# Patient Record
Sex: FEMALE | Race: BLACK OR AFRICAN AMERICAN | NOT HISPANIC OR LATINO | Employment: UNEMPLOYED
[De-identification: names, ages, dates, MRNs, and addresses within clinical notes are randomized per-mention and may not be internally consistent; named-entity substitution may affect disease eponyms.]

---

## 2017-07-08 ENCOUNTER — HEALTH MAINTENANCE LETTER (OUTPATIENT)
Age: 32
End: 2017-07-08

## 2017-07-24 ENCOUNTER — TELEPHONE (OUTPATIENT)
Dept: FAMILY MEDICINE | Facility: CLINIC | Age: 32
End: 2017-07-24

## 2017-07-24 ENCOUNTER — OFFICE VISIT (OUTPATIENT)
Dept: FAMILY MEDICINE | Facility: CLINIC | Age: 32
End: 2017-07-24
Payer: COMMERCIAL

## 2017-07-24 VITALS
SYSTOLIC BLOOD PRESSURE: 112 MMHG | HEART RATE: 55 BPM | BODY MASS INDEX: 21.68 KG/M2 | WEIGHT: 127 LBS | TEMPERATURE: 98.4 F | HEIGHT: 64 IN | DIASTOLIC BLOOD PRESSURE: 72 MMHG | OXYGEN SATURATION: 100 %

## 2017-07-24 DIAGNOSIS — S06.0X0A MILD CONCUSSION, WITHOUT LOC, INITIAL ENCOUNTER: Primary | ICD-10-CM

## 2017-07-24 PROCEDURE — 99213 OFFICE O/P EST LOW 20 MIN: CPT | Performed by: FAMILY MEDICINE

## 2017-07-24 NOTE — NURSING NOTE
"Chief Complaint   Patient presents with     Head Injury       Initial /72  Pulse 55  Temp 98.4  F (36.9  C) (Oral)  Ht 5' 4\" (1.626 m)  Wt 127 lb (57.6 kg)  LMP 07/21/2017  SpO2 100%  BMI 21.8 kg/m2 Estimated body mass index is 21.8 kg/(m^2) as calculated from the following:    Height as of this encounter: 5' 4\" (1.626 m).    Weight as of this encounter: 127 lb (57.6 kg).  Medication Reconciliation: complete   Jessica Ribeiro Certified Medical Assistant      "

## 2017-07-24 NOTE — TELEPHONE ENCOUNTER
Concern - Head injury  Onset: 3 days ago    Description:   Hit head when slipped in shower on Friday.     Intensity: Unable to determine--is having difficulty concentrating, has brusing and swelling on forehead where hit head.     Progression of Symptoms:  same    Accompanying Signs & Symptoms:  Unable to sleep, feels like is slow moving doing things    Previous history of similar problem:   NA    Precipitating factors:   Worsened by: NA      Alleviating factors:  Improved by: NA      Friday evening patient slipped and hit head hard getting out of shower. Bruising and swelling present on forehead where she hit, and near eye. Has a bad headache, not able to sleep. No loss of memory or loss of consciousness, feels like it is taking longer to do things. No progression of symptoms, no respiratory problems. No weakness of extremities. Discussed with Dr. Weiler -- appointment made for today. Rosey Davison R.N.

## 2017-07-24 NOTE — MR AVS SNAPSHOT
After Visit Summary   7/24/2017    Kianna Gore    MRN: [id number]           Patient Information     Date Of Birth          1985        Visit Information        Provider Department      7/24/2017 11:40 AM Weiler, Karen, MD [facility]        Today's Diagnoses     Mild concussion, without LOC, initial encounter    -  1      Care Instructions                   Concussion  What is a concussion?   A concussion is an injury to the brain caused by a blow to the head. A concussion may cause you to become temporarily confused or disoriented, have memory loss (amnesia), or become unconscious.   Concussions are the most common head injuries in sports.   How does it occur?   A concussion occurs when a blow to the head causes shaking, jarring, stretching, swelling, or tearing of brain tissue and delicate nerve fibers.   What are the symptoms?   If you have had a concussion you may have any of the following symptoms:   headache   confusion   memory loss (amnesia)   loss of consciousness   sleepiness   nausea or vomiting   trouble thinking   dizziness   weakness   seizures   loss of balance   blurred vision   sensitivity to light   You may have these symptoms for several days, weeks, or longer after the injury. This is called post-concussive syndrome.   If your neck hurts after a head injury, it is best to try not to move more than is necessary until it is checked by a healthcare provider. Anyone with a possibly serious neck injury should not move at all and an ambulance should be called.   How is it diagnosed?   Your healthcare provider will examine you and find out what happened. If you can't remember what happened, he or she may need to get this information from other people saw the accident. Your healthcare provider will:   do a neurologic exam   test your strength   test your memory   test your sensation, balance, and reflexes   check your vision   look at your eyes with a flashlight to  see if your pupils are the same size   You may be tested again several times during the next hour to check for any worsening of brain function, which might occur if you have any bleeding or swelling in the brain.   Your provider may do a CT scan or an MRI of your head to be sure there is no damage to your brain. Depending on how your head injury occurred, you may have neck X-rays to check your spine.   How is it treated?   The treatment for a concussion is rest. This means you may need to miss school, work, or other activities. Exercising too soon will make your symptoms last longer and may cause more problems. Limit activities that require thinking and concentration, such as, working on a computer or playing video games. Your brain needs to rest.   Headache may be treated with a mild pain reliever, such as acetaminophen. Nausea may be treated with a prescription medicine. Clear liquids or bland foods may be helpful.   If you have had a concussion, you need to be watched by a friend or relative for 8 to 12 hours. You should be awakened and checked every 2 to 4 hours while sleeping. Symptoms to report to your healthcare provider include:   confusion   seizures   unequal pupil sizes   restlessness or irritability   trouble using your legs or arms   worsening vomiting   headache that will not go away after taking acetaminophen (Tylenol)   garbled speech   bleeding from the ears or nose   decreasing alertness   unusual sleepiness or hard to wake up   a change in personality   If you are stable and recovering during the next 24 hours, you should rest for an another day or two. As your symptoms go away, you can begin to go back to your usual daily routine. However, you should stay away from any activities that would risk reinjury. A second concussion before the first one has healed could be very serious. Your healthcare provider will tell you when it is safe to return to sports and other activities.   How can I prevent a  concussion?   It is very important to understand that receiving a second blow to the head before the first injury is fully healed can be fatal, even if the second injury seems minor.   Using proper equipment (such as helmets and seat belts) and following proper techniques in sports such as football and soccer are the best ways to prevent concussions.     Published by [employer].  This content is reviewed periodically and is subject to change as new health information becomes available. The information is intended to inform and educate and is not a replacement for medical evaluation, advice, diagnosis or treatment by a healthcare professional.   Written by Yovany Yu MD, and Lola Hoyos MD, for [facility] [facility].   ? 2010 [facility] [place] and/or its affiliates. All Rights Reserved.   Copyright   Clinical Reference Systems 2011    Graduated return to play protocol after concussion  Rehabilitation stage  Functional exercise at each stage of rehabilitation  Objective of each stage    1. No activity Complete physical and cognitive rest Recovery   2. Light aerobic exercise Walking, swimming or stationary cycling keeping intensity <70 percent HR; no resistance training Increase HR   3. Sport-specific exercise Skating drills in ice hockey, running drills in soccer; no head impact activities Add movement   4. Non-contact training drills Progression to more complex training drills, eg, passing drills in football and ice hockey; may start progressive resistance training Exercise, coordination, and cognitive load   5. Full contact practice Following medical clearance, participate in normal training activities Restore confidence and assess functional skills by coaching staff   6. Return to play Normal game play    Six-day return to play protocol. Each day the athlete makes a stepwise increase in functional activity, is evaluated for symptoms, and is allowed to progress to the next stage each successive day if asymptomatic.   Clin  "J Sport Med 2009; 19:185. Copyright   2009 [employer].            Follow-ups after your visit        Who to contact     If you have questions or need follow up information about today's clinic visit or your schedule please contact [facility] directly at [phone].  Normal or non-critical lab and imaging results will be communicated to you by MyChart, letter or phone within 4 business days after the clinic has received the results. If you do not hear from us within 7 days, please contact the clinic through [employer] or phone. If you have a critical or abnormal lab result, we will notify you by phone as soon as possible.  Submit refill requests through [employer] or call your pharmacy and they will forward the refill request to us. Please allow 3 business days for your refill to be completed.          Additional Information About Your Visit        MyChart Information     [employer] gives you secure access to your electronic health record. If you see a primary care provider, you can also send messages to your care team and make appointments. If you have questions, please call your primary care clinic.  If you do not have a primary care provider, please call [phone] and they will assist you.        Care EveryWhere ID     This is your Care EveryWhere ID. This could be used by other organizations to access your [facility] medical records  [id number]        Your Vitals Were     Pulse Temperature Height Last Period Pulse Oximetry BMI (Body Mass Index)    55 98.4  F (36.9  C) (Oral) 5' 4\" (1.626 m) 07/21/2017 100% 21.8 kg/m2       Blood Pressure from Last 3 Encounters:   07/24/17 112/72   11/07/16 106/72   08/18/15 98/62    Weight from Last 3 Encounters:   07/24/17 127 lb (57.6 kg)   11/07/16 126 lb (57.2 kg)   08/18/15 120 lb (54.4 kg)              Today, you had the following     No orders found for display       Primary Care Provider Office Phone # Fax #    Karen Weiler, [phone] " [phone]       [facility] [address]  [facility] [address]        Equal Access to Services     YONNY GILBERT : Hadii jenny miller hadkvngo Sovalali, waaxda luqadaha, qaybta kaalmada geraldo, brett nadiyain hayaamick maciasscarlett mendoza laJodeetasha kelly. So [facility] [phone].    ATENCIÓN: Si habla español, tiene a shoemaker disposición servicios gratuitos de asistencia lingüística. Llame al [phone].    We comply with applicable federal civil rights laws and [place] laws. We do not discriminate on the basis of race, color, national origin, age, disability sex, sexual orientation or gender identity.            Thank you!     Thank you for choosing [facility]  for your care. Our goal is always to provide you with excellent care. Hearing back from our patients is one way we can continue to improve our services. Please take a few minutes to complete the written survey that you may receive in the mail after your visit with us. Thank you!             Your Updated Medication List - Protect others around you: Learn how to safely use, store and throw away your medicines at www.disposemymeds.org.      Notice  As of 7/24/2017 12:35 PM    You have not been prescribed any medications.

## 2017-07-24 NOTE — PROGRESS NOTES
SUBJECTIVE:                                                    Kianna Gore is a 31 year old female who presents to clinic today for the following health issues:    Concern - Head injury  Onset: 3 days ago    Description:   Hit head when slipped in shower on Friday.     Intensity: Unable to determine--is having difficulty concentrating, has brusing and swelling on forehead where hit head.     Progression of Symptoms:  same    Accompanying Signs & Symptoms:  Unable to sleep, feels like is slow moving doing things    Previous history of similar problem:   NA    Precipitating factors:   Worsened by: NA       Alleviating factors:  Improved by: NA       Triaged today (7/24/17):    Friday evening (7/21/17) pt slipped and hit head getting out of shower, bruising and swelling present on forehead where she hit, and near eye. Has a bad headache, not able to sleep. No loss of memory or LOC, feels like it is taking longer to do things. No progression of symptoms, no respiratory problems. No weakness of extremities. Discussed with Dr. Weiler -- appointment made for today. Rosey Davison R.N.    Today's visit (7/24/17):    No changes since talking to nurse today.    Pt notes she has hx of migraines, they have returned, was dizzy in the shower, fell as a result. No one was at home with her when she fell. Pt is experiencing mild blurry vision, no emesis or nausea, migraine has been constant since last Friday. Bright lights and electronic screens irritate her. She hit her head on her bathroom door frame. She used ice over weekend. She has been doing acupuncture for her migraines, stopped due to family problems, migraines redeveloped last month, has never been on everyday medications. Pt has been having hard time concentrating at her job. Her forehead pain is exacerbated with smiling, facial movements. No chest pain or palpitations. She attributes her falling to the residual effects of her chronic migraines and the heat of  "the water in from the shower. No back, or neck pain. Last night pt had hard time sleeping, was restless, felt exhausted but could not fall asleep. She notes she is not good with pills, does not want to be prescribed pain meds.      Problem list and histories reviewed & adjusted, as indicated.  Additional history: as documented      Reviewed and updated as needed this visit by clinical staff  Tobacco  Allergies  Meds  Med Hx  Surg Hx  Fam Hx  Soc Hx      Reviewed and updated as needed this visit by Provider       ROS:  Constitutional, HEENT, cardiovascular, pulmonary, gi and gu systems are negative, except as otherwise noted.    This document serves as a record of the services and decisions personally performed and made by Karen Weiler, MD. It was created on her behalf by Barron Nevarez, a trained medical scribe. The creation of this document is based on the provider's statements to the medical scribe.  Barron Nevarez 12:37 PM July 24, 2017    OBJECTIVE:   /72  Pulse 55  Temp 98.4  F (36.9  C) (Oral)  Ht 1.626 m (5' 4\")  Wt 57.6 kg (127 lb)  LMP 07/21/2017  SpO2 100%  BMI 21.8 kg/m2  Body mass index is 21.8 kg/(m^2).  GENERAL: healthy, alert and no distress  EYES: Eyes grossly normal to inspection, PERRL and conjunctivae and sclerae normal  HENT: ear canals and TM's normal, nose and mouth without ulcers or lesions  NECK: no adenopathy, no asymmetry, masses, or scars and thyroid normal to palpation  RESP: lungs clear to auscultation - no rales, rhonchi or wheezes  CV: regular rate and rhythm, normal S1 S2, no S3 or S4, no murmur, click or rub, no peripheral edema and peripheral pulses strong  ABDOMEN: soft, nontender, no hepatosplenomegaly, no masses and bowel sounds normal  MS: no gross musculoskeletal defects noted, no edema  NEURO: CN II-XII grossly intact.  Strength 5/5 and symmetrical in extremities.  FTN-FTF intact, normal strength and tone, mentation intact and speech normal  PSYCH: mentation " appears normal, affect normal/bright    Diagnostic Test Results:  none    ASSESSMENT/PLAN:     (S06.0X0A) Mild concussion, without LOC, initial encounter  (primary encounter diagnosis)  Comment: Discussed treatment options, will write note to have pt take work off until 7/28/17 to rest, keep applying ice to swollen area, no electronic screens, no heavy reading, lots of sleep. Pt is not good with pills, does not want to be prescribed pain meds, advised her to avoid taking Aspirin, take Ibuprofen as needed for pain.  Plan: Follow up if symptoms worsen or do not improve, follow up immediately if symptoms of emesis, increased fatigue, dizziness, memory loss occur. May consider imaging study if worsening symptoms.        The information in this document, created by the medical scribe for me, accurately reflects the services I personally performed and the decisions made by me. I have reviewed and approved this document for accuracy prior to leaving the patient care area.  July 24, 2017 12:37 PM    Karen Weiler, MD  [facility]

## 2017-07-24 NOTE — LETTER
Kianna Gore  [address]    [place] [address]        July 24, 2017           To Whom It May Concern:    Kianna Gore  was seen on 7/24/2017.  Please excuse her from work until 7/28/2017 due to injury. Please contact me with questions or concerns.        Sincerely,        Karen Weiler, MD

## 2017-07-24 NOTE — PATIENT INSTRUCTIONS
Concussion  What is a concussion?   A concussion is an injury to the brain caused by a blow to the head. A concussion may cause you to become temporarily confused or disoriented, have memory loss (amnesia), or become unconscious.   Concussions are the most common head injuries in sports.   How does it occur?   A concussion occurs when a blow to the head causes shaking, jarring, stretching, swelling, or tearing of brain tissue and delicate nerve fibers.   What are the symptoms?   If you have had a concussion you may have any of the following symptoms:   headache   confusion   memory loss (amnesia)   loss of consciousness   sleepiness   nausea or vomiting   trouble thinking   dizziness   weakness   seizures   loss of balance   blurred vision   sensitivity to light   You may have these symptoms for several days, weeks, or longer after the injury. This is called post-concussive syndrome.   If your neck hurts after a head injury, it is best to try not to move more than is necessary until it is checked by a healthcare provider. Anyone with a possibly serious neck injury should not move at all and an ambulance should be called.   How is it diagnosed?   Your healthcare provider will examine you and find out what happened. If you can't remember what happened, he or she may need to get this information from other people saw the accident. Your healthcare provider will:   do a neurologic exam   test your strength   test your memory   test your sensation, balance, and reflexes   check your vision   look at your eyes with a flashlight to see if your pupils are the same size   You may be tested again several times during the next hour to check for any worsening of brain function, which might occur if you have any bleeding or swelling in the brain.   Your provider may do a CT scan or an MRI of your head to be sure there is no damage to your brain. Depending on how your head injury occurred, you may have neck X-rays  to check your spine.   How is it treated?   The treatment for a concussion is rest. This means you may need to miss school, work, or other activities. Exercising too soon will make your symptoms last longer and may cause more problems. Limit activities that require thinking and concentration, such as, working on a computer or playing video games. Your brain needs to rest.   Headache may be treated with a mild pain reliever, such as acetaminophen. Nausea may be treated with a prescription medicine. Clear liquids or bland foods may be helpful.   If you have had a concussion, you need to be watched by a friend or relative for 8 to 12 hours. You should be awakened and checked every 2 to 4 hours while sleeping. Symptoms to report to your healthcare provider include:   confusion   seizures   unequal pupil sizes   restlessness or irritability   trouble using your legs or arms   worsening vomiting   headache that will not go away after taking acetaminophen (Tylenol)   garbled speech   bleeding from the ears or nose   decreasing alertness   unusual sleepiness or hard to wake up   a change in personality   If you are stable and recovering during the next 24 hours, you should rest for an another day or two. As your symptoms go away, you can begin to go back to your usual daily routine. However, you should stay away from any activities that would risk reinjury. A second concussion before the first one has healed could be very serious. Your healthcare provider will tell you when it is safe to return to sports and other activities.   How can I prevent a concussion?   It is very important to understand that receiving a second blow to the head before the first injury is fully healed can be fatal, even if the second injury seems minor.   Using proper equipment (such as helmets and seat belts) and following proper techniques in sports such as football and soccer are the best ways to prevent concussions.     Published by  [facility].  This content is reviewed periodically and is subject to change as new health information becomes available. The information is intended to inform and educate and is not a replacement for medical evaluation, advice, diagnosis or treatment by a healthcare professional.   Written by Yovany Yu MD, and Lola Hoyos MD, for [email or web address].   ? 2010 [facility] and/or its affiliates. All Rights Reserved.   Copyright   Clinical Reference Systems 2011    Graduated return to play protocol after concussion  Rehabilitation stage  Functional exercise at each stage of rehabilitation  Objective of each stage    1. No activity Complete physical and cognitive rest Recovery   2. Light aerobic exercise Walking, swimming or stationary cycling keeping intensity <70 percent HR; no resistance training Increase HR   3. Sport-specific exercise Skating drills in ice hockey, running drills in soccer; no head impact activities Add movement   4. Non-contact training drills Progression to more complex training drills, eg, passing drills in football and ice hockey; may start progressive resistance training Exercise, coordination, and cognitive load   5. Full contact practice Following medical clearance, participate in normal training activities Restore confidence and assess functional skills by coaching staff   6. Return to play Normal game play    Six-day return to play protocol. Each day the athlete makes a stepwise increase in functional activity, is evaluated for symptoms, and is allowed to progress to the next stage each successive day if asymptomatic.   Clin J Sport Med 2009; 19:185. Copyright   2009 [employer].

## 2018-10-09 ENCOUNTER — TRANSFERRED RECORDS (OUTPATIENT)
Dept: HEALTH INFORMATION MANAGEMENT | Facility: CLINIC | Age: 33
End: 2018-10-09

## 2019-03-29 ENCOUNTER — TELEPHONE (OUTPATIENT)
Dept: FAMILY MEDICINE | Facility: CLINIC | Age: 34
End: 2019-03-29

## 2019-03-29 ENCOUNTER — OFFICE VISIT (OUTPATIENT)
Dept: URGENT CARE | Facility: URGENT CARE | Age: 34
End: 2019-03-29
Payer: COMMERCIAL

## 2019-03-29 VITALS
WEIGHT: 126.1 LBS | RESPIRATION RATE: 16 BRPM | HEART RATE: 65 BPM | TEMPERATURE: 98.5 F | SYSTOLIC BLOOD PRESSURE: 124 MMHG | OXYGEN SATURATION: 98 % | BODY MASS INDEX: 21.65 KG/M2 | DIASTOLIC BLOOD PRESSURE: 74 MMHG

## 2019-03-29 DIAGNOSIS — N61.0 MASTITIS, RIGHT, ACUTE: Primary | ICD-10-CM

## 2019-03-29 DIAGNOSIS — N63.0 LUMP OR MASS IN BREAST: ICD-10-CM

## 2019-03-29 PROCEDURE — 99213 OFFICE O/P EST LOW 20 MIN: CPT | Performed by: FAMILY MEDICINE

## 2019-03-29 RX ORDER — CEPHALEXIN 250 MG/5ML
500 POWDER, FOR SUSPENSION ORAL 4 TIMES DAILY
Qty: 400 ML | Refills: 0 | Status: SHIPPED | OUTPATIENT
Start: 2019-03-29 | End: 2019-04-22

## 2019-03-29 NOTE — TELEPHONE ENCOUNTER
Call placed to Kianna. See below note also.  This morning left breat still hurts has large swollen tender red lump near nipple. 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 . Denies injury to breast. No fever. Feels warm to touch.     I advised it is important she be evaluated today as could be a serious skin infection or other etiology--needs to be examined to evaluate.     She had appointment scheduled but I have cancelled this for her and she is going to go to Urgent Care instead as appointment was really far from home and Urgent Care will open at 1 pm today. Rosey Davison R.N.

## 2019-03-29 NOTE — PATIENT INSTRUCTIONS
Patient Education     Cellulitis  Cellulitis is an infection of the deep layers of skin. A break in the skin, such as a cut or scratch, can let bacteria under the skin. If the bacteria get to deep layers of the skin, it can be serious. If not treated, cellulitis can get into the bloodstream and lymph nodes. The infection can then spread throughout the body. This causes serious illness.  Cellulitis causes the affected skin to become red, swollen, warm, and sore. The reddened areas have a visible border. An open sore may leak fluid (pus). You may have a fever, chills, and pain.  Cellulitis is treated with antibiotics taken for 7 to 10 days. An open sore may be cleaned and covered with cool wet gauze. Symptoms should get better 1 to 2 days after treatment is started. Make sure to take all the antibiotics for the full number of days until they are gone. Keep taking the medicine even if your symptoms go away.  Home care  Follow these tips:    Limit the use of the part of your body with cellulitis.     If the infection is on your leg, keep your leg raised while sitting. This will help to reduce swelling.    Take all of the antibiotic medicine exactly as directed until it is gone. Do not miss any doses, especially during the first 7 days. Don t stop taking the medicine when your symptoms get better.    Keep the affected area clean and dry.    Wash your hands with soap and warm water before and after touching your skin. Anyone else who touches your skin should also wash his or her hands. Don't share towels.  Follow-up care  Follow up with your healthcare provider, or as advised. If your infection does not go away on the first antibiotic, your healthcare provider will prescribe a different one.  When to seek medical advice  Call your healthcare provider right away if any of these occur:    Red areas that spread    Swelling or pain that gets worse    Fluid leaking from the skin (pus)    Fever higher of 100.4  F (38.0  C) or  higher after 2 days on antibiotics  Date Last Reviewed: 9/1/2016 2000-2018 [employer]. [address] [place] [address], [place] [address]. All rights reserved. This information is not intended as a substitute for professional medical care. Always follow your healthcare professional's instructions.           Patient Education     Cellulitis  Cellulitis is an infection of the deep layers of skin. A break in the skin, such as a cut or scratch, can let bacteria under the skin. If the bacteria get to deep layers of the skin, it can be serious. If not treated, cellulitis can get into the bloodstream and lymph nodes. The infection can then spread throughout the body. This causes serious illness.  Cellulitis causes the affected skin to become red, swollen, warm, and sore. The reddened areas have a visible border. An open sore may leak fluid (pus). You may have a fever, chills, and pain.  Cellulitis is treated with antibiotics taken for 7 to 10 days. An open sore may be cleaned and covered with cool wet gauze. Symptoms should get better 1 to 2 days after treatment is started. Make sure to take all the antibiotics for the full number of days until they are gone. Keep taking the medicine even if your symptoms go away.  Home care  Follow these tips:    Limit the use of the part of your body with cellulitis.     If the infection is on your leg, keep your leg raised while sitting. This will help to reduce swelling.    Take all of the antibiotic medicine exactly as directed until it is gone. Do not miss any doses, especially during the first 7 days. Don t stop taking the medicine when your symptoms get better.    Keep the affected area clean and dry.    Wash your hands with soap and warm water before and after touching your skin. Anyone else who touches your skin should also wash his or her hands. Don't share towels.  Follow-up care  Follow up with your healthcare provider, or as advised. If your infection does not go  away on the first antibiotic, your healthcare provider will prescribe a different one.  When to seek medical advice  Call your healthcare provider right away if any of these occur:    Red areas that spread    Swelling or pain that gets worse    Fluid leaking from the skin (pus)    Fever higher of 100.4  F (38.0  C) or higher after 2 days on antibiotics  Date Last Reviewed: 9/1/2016 2000-2018 [employer]. [address] [place] [address], [place] [address]. All rights reserved. This information is not intended as a substitute for professional medical care. Always follow your healthcare professional's instructions.

## 2019-03-29 NOTE — TELEPHONE ENCOUNTER
Reason for Call:  Other call back    Detailed comments: Last night pain in breast - right side. This AM she noticed a lump and still has pain. Not sure what to do   Phone Number Patient can be reached at: Cell number on file:    Telephone Information:   Mobile [phone]       Best Time:nalinus     Can we leave a detailed message on this number? YES    Call taken on 3/29/2019 at 7:52 AM by Rose Da Silva

## 2019-03-30 NOTE — PROGRESS NOTES
SUBJECTIVE:   Chief Complaint   Patient presents with     Urgent Care     Breast Pain     Right breast has lump on it, pain started last night, bump today      Kianna Gore is a 33 year old female who presents for evaluation of and area of redness, tenderness, swelling and warmth of the skin that developed on the  right, lateral  breast.  Patient has had pain, skin erythema (reddened skin) and tenderness for 1 days.    Precipitating event was unknown,  .    Therapies tried: none.  She is not breast feeding,  Not pregnant , not peripartum  She has not had recent mammogram    Past Medical History:   Diagnosis Date     NO ACTIVE PROBLEMS      Patient Active Problem List   Diagnosis     CARDIOVASCULAR SCREENING; LDL GOAL LESS THAN 160     Appendicitis, acute       ALLERGIES:  Dairy [milk products]      No current outpatient medications on file prior to visit.  No current facility-administered medications on file prior to visit.     Social History     Tobacco Use     Smoking status: Never Smoker     Smokeless tobacco: Never Used   Substance Use Topics     Alcohol use: Yes     Alcohol/week: 0.0 oz     Comment: rarely        Family History   Problem Relation Age of Onset     Neurologic Disorder Father         parkinsons     Neurologic Disorder Paternal Grandmother         parkinsons     Depression Sister      Anxiety Disorder Sister        ROS:  CONSTITUTIONAL: mild fever, chills,    INTEGUMENTARY/SKIN: NEGATIVE for worrisome rashes,   EYES: NEGATIVE for vision changes or irritation  ENT/MOUTH: NEGATIVE for ear, mouth and throat problems  GI: NEGATIVE for nausea, abdominal pain,   change in bowel habits    OBJECTIVE:  /74   Pulse 65   Temp 98.5  F (36.9  C) (Oral)   Resp 16   Wt 57.2 kg (126 lb 1.6 oz)   SpO2 98%   Breastfeeding? No   BMI 21.65 kg/m      SKIN: area involved is 5 cm by 3 cm on the right,  lateral breast.   The skin appear erythematous, moderately swollen, with tenderness and warmth to  the touch.       GENERAL:  Alert, mild distress  EYES: EOMI,   conjunctiva clear  HENT: External ears with no swelling or lesions   Nose and lips without  Swelling, ulcers, erythema or lesions  NECK: normal pain free ROM  RESP: no labored respirations, no tachypnea  EXTREMITIES:   Full ROM without expression of pain or limitation x 4 extremities  NEURO: Normal strength and tone, ambulation without difficulty,   normal speech and mentation  PSYCH: mentation and affect appears normal and patient appearance--appropriately groomed       ASSESSMENT:  Mastitis, right, acute     - cephALEXin (KEFLEX) 250 MG/5ML suspension; Take 10 mLs (500 mg) by mouth 4 times daily for 10 days      the patient to monitor for signs or symptoms of worsening/ spreading infection.  Go to Urgent care or Emergency Department if worsening fevers/chills and/or spreading infection.  Warm, moist packs or towels can be applied to the region to aid in resolution of the infection.  Use Tylenol or ibuprofen for pain or fever.         Lump or mass in breast     - MA Diagnostic Digital Bilateral     we discussed that mastitis is uncommon in women who are not breast feeding or peripartum.    Concern that she may have obstruction of her ducts-   Will have diagnostic mammogram to evaluate for risk of malignant tissue and to discuss any additional  evaluation

## 2019-04-03 ENCOUNTER — HOSPITAL ENCOUNTER (OUTPATIENT)
Dept: ULTRASOUND IMAGING | Facility: CLINIC | Age: 34
End: 2019-04-03
Attending: FAMILY MEDICINE
Payer: COMMERCIAL

## 2019-04-03 ENCOUNTER — HOSPITAL ENCOUNTER (OUTPATIENT)
Dept: MAMMOGRAPHY | Facility: CLINIC | Age: 34
Discharge: HOME | End: 2019-04-03
Attending: FAMILY MEDICINE | Admitting: FAMILY MEDICINE
Payer: COMMERCIAL

## 2019-04-03 PROCEDURE — 77066 DX MAMMO INCL CAD BI: CPT

## 2019-04-03 PROCEDURE — 76642 ULTRASOUND BREAST LIMITED: CPT | Mod: RT

## 2019-04-10 ENCOUNTER — HOSPITAL ENCOUNTER (OUTPATIENT)
Dept: ULTRASOUND IMAGING | Facility: CLINIC | Age: 34
End: 2019-04-10
Attending: FAMILY MEDICINE
Payer: COMMERCIAL

## 2019-04-10 ENCOUNTER — HOSPITAL ENCOUNTER (OUTPATIENT)
Dept: ULTRASOUND IMAGING | Facility: CLINIC | Age: 34
Discharge: HOME | End: 2019-04-10
Attending: FAMILY MEDICINE | Admitting: FAMILY MEDICINE
Payer: COMMERCIAL

## 2019-04-10 ENCOUNTER — HOSPITAL ENCOUNTER (OUTPATIENT)
Dept: MAMMOGRAPHY | Facility: CLINIC | Age: 34
End: 2019-04-10
Attending: FAMILY MEDICINE
Payer: COMMERCIAL

## 2019-04-10 DIAGNOSIS — N63.0 LUMP OR MASS IN BREAST: ICD-10-CM

## 2019-04-10 PROCEDURE — 88305 TISSUE EXAM BY PATHOLOGIST: CPT | Performed by: FAMILY MEDICINE

## 2019-04-10 PROCEDURE — 40000986 MA POST PROCEDURE RIGHT

## 2019-04-10 PROCEDURE — 38505 NEEDLE BIOPSY LYMPH NODES: CPT

## 2019-04-10 PROCEDURE — 25000125 ZZHC RX 250: Performed by: RADIOLOGY

## 2019-04-10 PROCEDURE — 27210206 US BREAST BIOPSY CORE NEEDLE RIGHT

## 2019-04-10 RX ADMIN — LIDOCAINE HYDROCHLORIDE 5 ML: 10 INJECTION, SOLUTION EPIDURAL; INFILTRATION; INTRACAUDAL; PERINEURAL at 13:48

## 2019-04-10 NOTE — DISCHARGE INSTRUCTIONS
Page 1 of 1  For informational purposes only. Not to replace the advice of your health care provider. Copyright   2010 [facility]. All rights reserved. [employer] 905398 - REV 02/16.  After Your Breast Biopsy   Bleeding or bruising  Slight bruising is normal. If you bleed through the bandage, put direct pressure on the breast for 10 minutes.   If the breast begins to swell, or you have a lot of bleeding after 10 minutes of pressure, call the doctor who ordered your exam. Or, go to the emergency room.   Bandages  Keep your bandage in place until tomorrow morning. Do not get it wet.   If you have small pieces of tape on the skin, leave them in place. They will fall off on their own, or you can remove them after 5 days.   Activity  You may shower the morning after the exam. No heavy activity (lifting, vacuuming) on the day of your exam. You may go back to normal activity the next day, unless you had a lot of bleeding or pain.  Discomfort  You may take Tylenol (acetaminophen) today for pain. Tomorrow, you may take an anti-inflammatory medicine (aspirin, ibuprofen, Motrin, Aleve, Advil), unless your doctor tells you not to.  Wear your bra overnight to support the breast. You may also use an ice pack: Place it over the area for 15-20 minutes several times a day.  Infection  Infection is rare. Symptoms include fever, redness, increasing pain and fluid draining from the biopsy site. If you have any of these symptoms, please call the doctor who ordered your exam.  Results  Results may take up to 5 business days. A nurse or doctor from the Breast Center will call with your results. We will also send the results to the doctor who ordered your biopsy.  If you have not heard your results in 5 days, please call the Breast Center.   Other instructions  ______________________________________________________________________________________________________________________________  Call your doctor if:    You have  bleeding that lasts more than 10 minutes.    You have pain that cannot be controlled.   You have signs of infection (fever, redness, drainage or other signs).   You have not received your results within 5 days.    Please call the Breast Center nurse navigator at [phone] if you have questions or concerns about your biopsy.

## 2019-04-11 ENCOUNTER — TELEPHONE (OUTPATIENT)
Dept: MAMMOGRAPHY | Facility: CLINIC | Age: 34
End: 2019-04-11

## 2019-04-11 LAB — COPATH REPORT: NORMAL

## 2019-04-11 NOTE — TELEPHONE ENCOUNTER
Pathology report reviewed with breast radiologist Carl. I phoned and informed patient of results showing benign fibrocystic change and old hemorrhage . Recommended follow up is clinical management.  Patient reports that she had a possible mastitis a few weeks ago, was on antibiotic, symptoms resolved, but her PCP wanted mammo to be sure. That has lead to further work up and US core biopsy.   Advised her that if symptoms return she should talk again with her PCP about more antibiotics.  Patient agrees.    Patient states no problems with biopsy site. Questions were answered and my phone number given if she has further questions or concerns.

## 2019-04-22 ENCOUNTER — OFFICE VISIT (OUTPATIENT)
Dept: FAMILY MEDICINE | Facility: CLINIC | Age: 34
End: 2019-04-22
Payer: COMMERCIAL

## 2019-04-22 VITALS
OXYGEN SATURATION: 100 % | TEMPERATURE: 98.1 F | BODY MASS INDEX: 21.34 KG/M2 | WEIGHT: 125 LBS | HEART RATE: 62 BPM | DIASTOLIC BLOOD PRESSURE: 70 MMHG | HEIGHT: 64 IN | SYSTOLIC BLOOD PRESSURE: 96 MMHG

## 2019-04-22 DIAGNOSIS — N60.41 MAMMARY DUCT ECTASIA OF RIGHT BREAST: ICD-10-CM

## 2019-04-22 DIAGNOSIS — N61.0 MASTITIS: Primary | ICD-10-CM

## 2019-04-22 PROCEDURE — 99213 OFFICE O/P EST LOW 20 MIN: CPT | Performed by: NURSE PRACTITIONER

## 2019-04-22 RX ORDER — AMOXICILLIN AND CLAVULANATE POTASSIUM 400; 57 MG/5ML; MG/5ML
POWDER, FOR SUSPENSION ORAL
Qty: 220 ML | Refills: 0 | Status: SHIPPED | OUTPATIENT
Start: 2019-04-22 | End: 2019-05-06

## 2019-04-22 ASSESSMENT — MIFFLIN-ST. JEOR: SCORE: 1257

## 2019-04-22 NOTE — PROGRESS NOTES
SUBJECTIVE:   Kianna Gore is a 33 year old female who presents to clinic today for the following   health issues:        ED/UC Followup:    Facility:  [facility]  Date of visit: 3/29/19  Reason for visit: Breast Pain  Current Status: Was given an antibiotic (Keflex x 10 days, completed regimen), had mammogram, ultrasounds and biopsy was told it was an infection, right breast is still red, swollen and still painful- constant          Additional history: as documented    Reviewed  and updated as needed this visit by clinical staff         Reviewed and updated as needed this visit by Provider         Patient Active Problem List   Diagnosis     CARDIOVASCULAR SCREENING; LDL GOAL LESS THAN 160     Appendicitis, acute     Past Surgical History:   Procedure Laterality Date     HC EXCISION BREAST LESION, OPEN >=1  9/8/2009    Rt. Breast/  Fibroadenoma     HC REMOVE TONSILS/ADENOIDS,12+ Y/O  2002    T & A 12+y.o.     LAPAROSCOPIC APPENDECTOMY  3/14/2012    Procedure:LAPAROSCOPIC APPENDECTOMY; Laparoscopic Appendectomy; Surgeon:ANTHONY MORAN; Location:[facility] OR     SURGICAL HISTORY OF -   2006    Breast Augmentation       Social History     Tobacco Use     Smoking status: Never Smoker     Smokeless tobacco: Never Used   Substance Use Topics     Alcohol use: Yes     Alcohol/week: 0.0 oz     Comment: rarely      Family History   Problem Relation Age of Onset     Neurologic Disorder Father         parkinsons     Neurologic Disorder Paternal Grandmother         parkinsons     Depression Sister      Anxiety Disorder Sister          Current Outpatient Medications   Medication Sig Dispense Refill     amoxicillin-clavulanate (AUGMENTIN) 400-57 MG/5ML suspension Take 11 mls (by mouth) 2 times daily for 10 days 220 mL 0     Allergies   Allergen Reactions     Dairy [Milk Products]        ROS:  Constitutional, HEENT, cardiovascular, pulmonary, gi and gu systems are negative, except as otherwise noted.  Breast:  See  "HPI    OBJECTIVE:     BP 96/70 (BP Location: Right arm, Patient Position: Sitting, Cuff Size: Adult Regular)   Pulse 62   Temp 98.1  F (36.7  C) (Oral)   Ht 1.626 m (5' 4\")   Wt 56.7 kg (125 lb)   LMP 04/09/2019   SpO2 100%   BMI 21.46 kg/m    Body mass index is 21.46 kg/m .  GENERAL: healthy, alert and no distress  RESP: lungs clear to auscultation - no rales, rhonchi or wheezes  BREAST: right breast with erythema and induration surrounding nipple which is tender to palpation  CV: regular rate and rhythm, normal S1 S2, no S3 or S4, no murmur, click or rub, no peripheral edema and peripheral pulses strong  PSYCH: mentation appears normal, affect normal/bright      ASSESSMENT/PLAN:     Kianna was seen today for urgent care.    Diagnoses and all orders for this visit:    Mammary duct ectasia of right breast  Mastitis  -     amoxicillin-clavulanate (AUGMENTIN) 400-57 MG/5ML suspension; Take 11 mls (by mouth) 2 times daily for 10 days  Warm compress 3-4 times daily to right breast.   Gently massage.   Ibuprofen, 600 mg every 6-8 hour scheduled for the next 3-5 days.      Follow-up in 7-10 days for recheck, sooner with any worsening or new onset fever/chills.            BRYON Cantu [facility] [place]      "

## 2019-04-22 NOTE — PATIENT INSTRUCTIONS
Kianna was seen today for urgent care.    Diagnoses and all orders for this visit:    Mastitis  -     amoxicillin-clavulanate (AUGMENTIN) 400-57 MG/5ML suspension; Take 11 mls (by mouth) 2 times daily for 10 days    Warm compress 3-4 times daily to right breast.   Gently massage.   Ibuprofen, 600 mg every 6-8 hour scheduled for the next 3-5 days.      Follow-up in 7-10 days, sooner with any worsening or new onset fever/chills.

## 2019-05-06 ENCOUNTER — OFFICE VISIT (OUTPATIENT)
Dept: FAMILY MEDICINE | Facility: CLINIC | Age: 34
End: 2019-05-06
Payer: COMMERCIAL

## 2019-05-06 ENCOUNTER — TELEPHONE (OUTPATIENT)
Dept: FAMILY MEDICINE | Facility: CLINIC | Age: 34
End: 2019-05-06

## 2019-05-06 VITALS
WEIGHT: 124 LBS | HEIGHT: 64 IN | BODY MASS INDEX: 21.17 KG/M2 | DIASTOLIC BLOOD PRESSURE: 68 MMHG | SYSTOLIC BLOOD PRESSURE: 100 MMHG | HEART RATE: 76 BPM | OXYGEN SATURATION: 98 % | TEMPERATURE: 98.3 F

## 2019-05-06 DIAGNOSIS — N60.41 MAMMARY DUCT ECTASIA OF RIGHT BREAST: ICD-10-CM

## 2019-05-06 DIAGNOSIS — N61.0 MASTITIS: Primary | ICD-10-CM

## 2019-05-06 PROCEDURE — 99213 OFFICE O/P EST LOW 20 MIN: CPT | Performed by: NURSE PRACTITIONER

## 2019-05-06 ASSESSMENT — MIFFLIN-ST. JEOR: SCORE: 1252.46

## 2019-05-06 NOTE — LETTER
[facility]  [address]  [place] [id number] [phone]  May 6, 2019    Kianna Gore  [address]    [place] [facility] [id number]    Dear Kianna,    I care about your health and have reviewed your health plan. I have reviewed your medical conditions, medication list, and lab results and am making recommendations based on this review, to better manage your health.    You are in particular need of attention regarding:  -Cervical Cancer Screening  -Wellness (Physical) Visit     I am recommending that you:  -schedule a WELLNESS (Physical) APPOINTMENT with me.   I will check fasting labs the same day - nothing to eat except water and meds for 8-10 hours prior.    -schedule a PAP SMEAR EXAM which is due.  Please disregard this reminder if you have had this exam elsewhere within the last year.  It would be helpful for us to have a copy of your recent pap smear report in our file so that we can best coordinate your care.    If you are under/uninsured, we recommend you contact the [facility]. They offer pap smears at no charge or on a sliding fee charge. You can schedule with them at [phone]. Please have them send us the results.  Please call us at [phone] (or use [employer]) to address the above recommendations.     Thank you for trusting [facility] and we appreciate the opportunity to serve you.  We look forward to supporting your healthcare needs in the future.    Healthy Regards,    [facility] Care Team

## 2019-05-06 NOTE — TELEPHONE ENCOUNTER
Needs of attention regarding:  -Cervical Cancer Screening  -Wellness (Physical) Visit     Health Maintenance Topics with due status: Overdue       Topic Date Due    PREVENTIVE CARE VISIT 05/02/2015    PAP Q3 YR 05/02/2017       Communication:  See Letter

## 2019-05-06 NOTE — PROGRESS NOTES
"  SUBJECTIVE:   Kianna Gore is a 33 year old female who presents to clinic today for the following   health issues:        ED/UC Followup:    Facility:  [facility] [place]  Date of visit: 4/22/19  Reason for visit: Follow  Up from [facility] visit on 3/29/19 for Mastitis   Current Status: completed 2nd round of antibiotic and no improvement   Completed Keflex x 10 days after urgent care visit on 3/29/19.     Last seen in clinic on 4/22/19 and was prescribed Augmentin x 10 days.          Additional history: as documented    Reviewed  and updated as needed this visit by clinical staff         Reviewed and updated as needed this visit by Provider         Patient Active Problem List   Diagnosis     CARDIOVASCULAR SCREENING; LDL GOAL LESS THAN 160     Appendicitis, acute     Past Surgical History:   Procedure Laterality Date     HC EXCISION BREAST LESION, OPEN >=1  9/8/2009    Rt. Breast/  Fibroadenoma     HC REMOVE TONSILS/ADENOIDS,12+ Y/O  2002    T & A 12+y.o.     LAPAROSCOPIC APPENDECTOMY  3/14/2012    Procedure:LAPAROSCOPIC APPENDECTOMY; Laparoscopic Appendectomy; Surgeon:ANTHONY MORAN; Location:[facility] OR     SURGICAL HISTORY OF -   2006    Breast Augmentation       Social History     Tobacco Use     Smoking status: Never Smoker     Smokeless tobacco: Never Used   Substance Use Topics     Alcohol use: Yes     Alcohol/week: 0.0 oz     Comment: rarely      Family History   Problem Relation Age of Onset     Neurologic Disorder Father         parkinsons     Neurologic Disorder Paternal Grandmother         parkinsons     Depression Sister      Anxiety Disorder Sister          No current outpatient medications on file.     Allergies   Allergen Reactions     Dairy [Milk Products]        ROS:  Constitutional, HEENT, cardiovascular, pulmonary, gi and gu systems are negative, except as otherwise noted.    OBJECTIVE:     /68   Pulse 76   Temp 98.3  F (36.8  C) (Oral)   Ht 1.626 m (5' 4\")   Wt 56.2 kg (124 lb)   LMP " 04/09/2019 (Approximate)   SpO2 98%   BMI 21.28 kg/m    Body mass index is 21.28 kg/m .  GENERAL: healthy, alert and no distress  BREAST: right breast with erythema and induration surrounding nipple which is tender to palpation  PSYCH: mentation appears normal, affect normal/bright      ASSESSMENT/PLAN:     Kianna was seen today for recheck.    Diagnoses and all orders for this visit:    Mastitis  Mammary duct ectasia of right breast  -     GENERAL SURG ADULT REFERRAL  -     Completed 2 courses of antibiotics (Keflex and Augmentin) with no noted improvement in erythema, induration and pain.   -     Further consultation with breast surgeon to evaluate for need for I&D and wound culture.     Patient to follow-up in clinic as needed and for screening pap smear.            [facility]

## 2019-05-08 ENCOUNTER — OFFICE VISIT (OUTPATIENT)
Dept: SURGERY | Facility: CLINIC | Age: 34
End: 2019-05-08
Payer: COMMERCIAL

## 2019-05-08 VITALS
SYSTOLIC BLOOD PRESSURE: 104 MMHG | BODY MASS INDEX: 21.17 KG/M2 | WEIGHT: 124 LBS | OXYGEN SATURATION: 98 % | HEIGHT: 64 IN | RESPIRATION RATE: 16 BRPM | HEART RATE: 60 BPM | DIASTOLIC BLOOD PRESSURE: 62 MMHG

## 2019-05-08 DIAGNOSIS — N61.0 INFECTION OF RIGHT BREAST: Primary | ICD-10-CM

## 2019-05-08 PROCEDURE — 99203 OFFICE O/P NEW LOW 30 MIN: CPT | Performed by: SURGERY

## 2019-05-08 RX ORDER — SULFAMETHOXAZOLE AND TRIMETHOPRIM 400; 80 MG/1; MG/1
1 TABLET ORAL 2 TIMES DAILY
Qty: 16 TABLET | Refills: 0 | Status: SHIPPED | OUTPATIENT
Start: 2019-05-08 | End: 2019-05-09 | Stop reason: ALTCHOICE

## 2019-05-08 ASSESSMENT — MIFFLIN-ST. JEOR: SCORE: 1252.46

## 2019-05-08 ASSESSMENT — ENCOUNTER SYMPTOMS
FEVER: 0
COUGH: 0
WEIGHT LOSS: 0
CONSTITUTIONAL NEGATIVE: 1
SHORTNESS OF BREATH: 0
CHILLS: 0

## 2019-05-08 NOTE — PROGRESS NOTES
HPI  We have been asked by Dr. Baugh to see Ms. Gore in consultation concerning a right breast infection.  She became aware of some redness and swelling along with pain about 6 weeks ago.  She was on cephalexin and then on Augmentin.   The pain and swelling have resolved somewhat but not completely gone away.  She has had an ultrasound, mammogram and biopsy during that time.  The biopsy finding was not worrisome.  She presents today for surgical evaluation for further treatment for this probable mastitis.  She is not and has never been pregnant.  She had breast implants about 10 years ago with no problems since then.  She has not had drainage from her nipple.  She has had no specific trauma to the breast.  She has no known history of resistant infections.She does not smoke cigarettes  Review of Systems   Constitutional: Negative.  Negative for chills, fever and weight loss.   Respiratory: Negative for cough and shortness of breath.          Physical Exam   Constitutional: She appears well-developed and well-nourished. No distress.   HENT:   Head: Normocephalic and atraumatic.   Nose: Nose normal.   Eyes: No scleral icterus.   Neck: No JVD present. No tracheal deviation present. No thyromegaly present.   Pulmonary/Chest: Effort normal. No respiratory distress. She exhibits tenderness.   Lymphadenopathy:     She has no cervical adenopathy.     She has no axillary adenopathy.        Right: No supraclavicular adenopathy present.        Left: No supraclavicular adenopathy present.   Skin: She is not diaphoretic.   Breast: Right breast has swelling that is very soft area at the lateral areolar border.  There is some erythema around this measuring perhaps 4 cm.  There is some lateral lumpiness but no worrisome masses.  There are clearly implants in place.  Texture of the left breast is more firm in the area of the areola.  Axillae: There are normal feeling axillary nodes on each side.  Impression: Breast fluid  collection with partially resolved cellulitis and mastitis.  This will probably resolve quicker if it is drained.  Options are operative drainage with open incision and frequent packing.  Other option would be ultrasound-guided aspiration of probable fluid collection.  I would switch her to a medication that covers MRSA such as Septra.  Options reviewed in detail with the patient and she is chosen to proceed with  Ultrasound-guided drainage and oral sulfa antibiotic.  She will check back in a week to 2 to let us know how this is healing.    Copy to PCP

## 2019-05-09 ENCOUNTER — TELEPHONE (OUTPATIENT)
Dept: SURGERY | Facility: CLINIC | Age: 34
End: 2019-05-09

## 2019-05-09 DIAGNOSIS — N61.0 INFECTION OF RIGHT BREAST: Primary | ICD-10-CM

## 2019-05-09 RX ORDER — SULFAMETHOXAZOLE AND TRIMETHOPRIM 200; 40 MG/5ML; MG/5ML
10 SUSPENSION ORAL 2 TIMES DAILY
Qty: 140 ML | Refills: 0 | Status: ON HOLD | OUTPATIENT
Start: 2019-05-09 | End: 2019-05-28

## 2019-05-09 NOTE — TELEPHONE ENCOUNTER
Name of caller: Patient    Reason for Call:  Pt wondering if she can get a liquid form of antibiotic that Dr Trejo prescribed yesterday    Surgeon:  Dr. Trejo     Recent Surgery:  No    If yes, when & what type:  N/A      Best phone number to reach pt at is: [phone]  Ok to leave a message with medical info? Yes.    Pharmacy preferred (if calling for a refill): did not ask

## 2019-05-14 ENCOUNTER — HOSPITAL ENCOUNTER (OUTPATIENT)
Dept: ULTRASOUND IMAGING | Facility: CLINIC | Age: 34
End: 2019-05-14
Attending: SURGERY
Payer: COMMERCIAL

## 2019-05-14 ENCOUNTER — HOSPITAL ENCOUNTER (OUTPATIENT)
Dept: ULTRASOUND IMAGING | Facility: CLINIC | Age: 34
Discharge: HOME | End: 2019-05-14
Attending: SURGERY | Admitting: SURGERY
Payer: COMMERCIAL

## 2019-05-14 DIAGNOSIS — N61.0 INFECTION OF RIGHT BREAST: ICD-10-CM

## 2019-05-14 LAB
GRAM STN SPEC: ABNORMAL
SPECIMEN SOURCE: ABNORMAL

## 2019-05-14 PROCEDURE — 25000125 ZZHC RX 250: Performed by: RADIOLOGY

## 2019-05-14 PROCEDURE — 88112 CYTOPATH CELL ENHANCE TECH: CPT | Performed by: SURGERY

## 2019-05-14 PROCEDURE — 88305 TISSUE EXAM BY PATHOLOGIST: CPT | Mod: 26 | Performed by: SURGERY

## 2019-05-14 PROCEDURE — 87205 SMEAR GRAM STAIN: CPT | Performed by: SURGERY

## 2019-05-14 PROCEDURE — 87070 CULTURE OTHR SPECIMN AEROBIC: CPT | Performed by: SURGERY

## 2019-05-14 PROCEDURE — 87077 CULTURE AEROBIC IDENTIFY: CPT | Performed by: SURGERY

## 2019-05-14 PROCEDURE — 88312 SPECIAL STAINS GROUP 1: CPT | Mod: 26 | Performed by: SURGERY

## 2019-05-14 PROCEDURE — 88305 TISSUE EXAM BY PATHOLOGIST: CPT | Performed by: SURGERY

## 2019-05-14 PROCEDURE — 76942 ECHO GUIDE FOR BIOPSY: CPT

## 2019-05-14 PROCEDURE — 88341 IMHCHEM/IMCYTCHM EA ADD ANTB: CPT | Performed by: SURGERY

## 2019-05-14 PROCEDURE — 88342 IMHCHEM/IMCYTCHM 1ST ANTB: CPT | Mod: 26 | Performed by: SURGERY

## 2019-05-14 PROCEDURE — 00000155 ZZHCL STATISTIC H-CELL BLOCK W/STAIN: Performed by: SURGERY

## 2019-05-14 PROCEDURE — 00000102 ZZHCL STATISTIC CYTO WRIGHT STAIN TC: Performed by: SURGERY

## 2019-05-14 PROCEDURE — 76642 ULTRASOUND BREAST LIMITED: CPT | Mod: RT

## 2019-05-14 PROCEDURE — 88342 IMHCHEM/IMCYTCHM 1ST ANTB: CPT | Performed by: SURGERY

## 2019-05-14 PROCEDURE — 88112 CYTOPATH CELL ENHANCE TECH: CPT | Mod: 26 | Performed by: SURGERY

## 2019-05-14 PROCEDURE — 27210206 US BREAST BIOPSY CORE NEEDLE RIGHT

## 2019-05-14 PROCEDURE — 88341 IMHCHEM/IMCYTCHM EA ADD ANTB: CPT | Mod: 26 | Performed by: SURGERY

## 2019-05-14 PROCEDURE — 88312 SPECIAL STAINS GROUP 1: CPT | Performed by: SURGERY

## 2019-05-14 RX ADMIN — LIDOCAINE HYDROCHLORIDE 5 ML: 10 INJECTION, SOLUTION EPIDURAL; INFILTRATION; INTRACAUDAL; PERINEURAL at 09:09

## 2019-05-14 RX ADMIN — LIDOCAINE HYDROCHLORIDE 5 ML: 10 INJECTION, SOLUTION EPIDURAL; INFILTRATION; INTRACAUDAL; PERINEURAL at 09:17

## 2019-05-14 NOTE — DISCHARGE INSTRUCTIONS
Page 1 of 1  For informational purposes only. Not to replace the advice of your health care provider. Copyright   2010 [facility]. All rights reserved. [employer] [id number] - REV 02/16.  After Your Breast Biopsy   Bleeding or bruising  Slight bruising is normal. If you bleed through the bandage, put direct pressure on the breast for 10 minutes.   If the breast begins to swell, or you have a lot of bleeding after 10 minutes of pressure, call the doctor who ordered your exam. Or, go to the emergency room.   Bandages  Keep your bandage in place until tomorrow morning. Do not get it wet.   If you have small pieces of tape on the skin, leave them in place. They will fall off on their own, or you can remove them after 5 days.   Activity  You may shower the morning after the exam. No heavy activity (lifting, vacuuming) on the day of your exam. You may go back to normal activity the next day, unless you had a lot of bleeding or pain.  Discomfort  You may take Tylenol (acetaminophen) today for pain. Tomorrow, you may take an anti-inflammatory medicine (aspirin, ibuprofen, Motrin, Aleve, Advil), unless your doctor tells you not to.  Wear your bra overnight to support the breast. You may also use an ice pack: Place it over the area for 15-20 minutes several times a day.  Infection  Infection is rare. Symptoms include fever, redness, increasing pain and fluid draining from the biopsy site. If you have any of these symptoms, please call the doctor who ordered your exam.  Results  Results may take up to 5 business days. A nurse or doctor from the Breast Center will call with your results. We will also send the results to the doctor who ordered your biopsy.  If you have not heard your results in 5 days, please call the Breast Center.   Other instructions  ______________________________________________________________________________________________________________________________  Call your doctor if:    You have  bleeding that lasts more than 10 minutes.    You have pain that cannot be controlled.   You have signs of infection (fever, redness, drainage or other signs).   You have not received your results within 5 days.    Please call the Breast Center nurse navigator at [phone] if you have questions or concerns about your biopsy.

## 2019-05-15 ENCOUNTER — OFFICE VISIT (OUTPATIENT)
Dept: SURGERY | Facility: CLINIC | Age: 34
End: 2019-05-15
Payer: COMMERCIAL

## 2019-05-15 VITALS — SYSTOLIC BLOOD PRESSURE: 100 MMHG | DIASTOLIC BLOOD PRESSURE: 60 MMHG | HEART RATE: 93 BPM

## 2019-05-15 DIAGNOSIS — N61.0 INFECTION OF RIGHT BREAST: Primary | ICD-10-CM

## 2019-05-15 PROCEDURE — 99213 OFFICE O/P EST LOW 20 MIN: CPT | Performed by: SURGERY

## 2019-05-16 ENCOUNTER — TELEPHONE (OUTPATIENT)
Dept: MAMMOGRAPHY | Facility: CLINIC | Age: 34
End: 2019-05-16

## 2019-05-16 LAB
COPATH REPORT: NORMAL
COPATH REPORT: NORMAL

## 2019-05-16 NOTE — TELEPHONE ENCOUNTER
Pathology report reviewed with breast radiologist Saul. I phoned and informed patient of results showing benign acute and chronic inflamation. Recommended follow up is surgical consult regarding abscess.  Patient voices some relief after having fluid drained.  States fluid continues to  Drain from biopsy site.  She is dressing area to keep it clean and dry. She continues on Bactrim and has appointment with breast surgeon on 5-24-19.    Patient states no problems with biopsy site. Questions were answered and my phone number given if she has further questions or concerns.

## 2019-05-20 NOTE — PROGRESS NOTES
Area aspirated by radiology yesterday. Draining thick fluid through puncture hole. Swelling and erythema diminished. No worsening pain. On Septra. Doing dressing changes as needed.  Imp: drained breast infection.   Plan: change dressings PRN. May shower area. Take Septra. See Dr. Carrington next week in office.

## 2019-05-22 LAB
BACTERIA SPEC CULT: ABNORMAL
SPECIMEN SOURCE: ABNORMAL

## 2019-05-24 ENCOUNTER — TELEPHONE (OUTPATIENT)
Dept: SURGERY | Facility: CLINIC | Age: 34
End: 2019-05-24

## 2019-05-24 ENCOUNTER — OFFICE VISIT (OUTPATIENT)
Dept: SURGERY | Facility: CLINIC | Age: 34
End: 2019-05-24
Payer: COMMERCIAL

## 2019-05-24 VITALS
BODY MASS INDEX: 21.17 KG/M2 | DIASTOLIC BLOOD PRESSURE: 64 MMHG | RESPIRATION RATE: 16 BRPM | HEIGHT: 64 IN | HEART RATE: 70 BPM | WEIGHT: 124 LBS | SYSTOLIC BLOOD PRESSURE: 92 MMHG | OXYGEN SATURATION: 100 %

## 2019-05-24 DIAGNOSIS — N61.1 ABSCESS OF RIGHT BREAST: Primary | ICD-10-CM

## 2019-05-24 PROCEDURE — 99213 OFFICE O/P EST LOW 20 MIN: CPT | Performed by: SURGERY

## 2019-05-24 RX ORDER — SULFAMETHOXAZOLE AND TRIMETHOPRIM 200; 40 MG/5ML; MG/5ML
10 SUSPENSION ORAL 2 TIMES DAILY
Qty: 490 ML | Refills: 0 | Status: SHIPPED | OUTPATIENT
Start: 2019-05-24 | End: 2019-06-18

## 2019-05-24 ASSESSMENT — MIFFLIN-ST. JEOR: SCORE: 1252.46

## 2019-05-24 NOTE — TELEPHONE ENCOUNTER
Type of surgery: right breast incision and drainage  Location of surgery: [facility]  Date and time of surgery: 05/28/19 3:30pm  Surgeon: Dr Carrington  Pre-Op Appt Date: Dr Carrington 5/24  Post-Op Appt Date: pt to schedule   Packet sent out: Yes  Pre-cert/Authorization completed:  Not Applicable  Date: 05/24/19    General anesthsia

## 2019-05-24 NOTE — PROGRESS NOTES
[facility]  Breast Center Follow Up Note    CHIEF COMPLAINT:  Right breast abscess    HISTORY OF PRESENT ILLNESS:  Kianna Gore is a 33 year old female who is seen in follow up for right breast abscess. She underwent ultrasound guided aspiration and core needle biopsy with radiology on 5/14/2019 with 6ml of cloudy fluid evacuated. Biopsies revealed acute and chronic inflammation with granulomatous inflammatory reaction. Gram stain revealed gram positive cocci. Negative for fungal and acid fast bacilli. No malignancy.     She is here for follow up. She reports her breast is very painful and she is having ongoing drainage from the open areas on the periareolar aspect of her breast. She also has had some drainage from the needle biopsy site. No fevers or chills. No nausea. Painful with dressing changes.      REVIEW OF SYSTEMS:  Constitutional:  Negative for chills, fatigue, fever and weight change.  Eyes:  Negative for blurred vision, eye pain and photophobia.  ENT:  Negative for hearing problems, ENT pain, congestion, rhinorrhea, epistaxis, hoarseness and dental problems.  Cardiovascular:  Negative for chest pain, palpitations, tachycardia, orthopnea and edema.  Respiratory:  Negative for cough, dyspnea and hemoptysis.  Gastrointestinal:  Negative for abdominal pain, heartburn, constipation, diarrhea and stool changes.  Musculoskeletal:  Negative for arthralgias, back pain and myalgias.  Integumentary/Breast:  See HPI.    Past Medical History:   Diagnosis Date     NO ACTIVE PROBLEMS        Past Surgical History:   Procedure Laterality Date     HC EXCISION BREAST LESION, OPEN >=1  9/8/2009    Rt. Breast/  Fibroadenoma     HC REMOVE TONSILS/ADENOIDS,12+ Y/O  2002    T & A 12+y.o.     LAPAROSCOPIC APPENDECTOMY  3/14/2012    Procedure:LAPAROSCOPIC APPENDECTOMY; Laparoscopic Appendectomy; Surgeon:ANTHONY MORAN; Location:[facility] OR     SURGICAL HISTORY OF -   2006    Breast Augmentation       Family History   Problem  "Relation Age of Onset     Neurologic Disorder Father         parkinsons     Neurologic Disorder Paternal Grandmother         parkinsons     Depression Sister      Anxiety Disorder Sister        Social History     Tobacco Use     Smoking status: Never Smoker     Smokeless tobacco: Never Used   Substance Use Topics     Alcohol use: Yes     Alcohol/week: 0.0 oz     Comment: rarely        Patient Active Problem List   Diagnosis     CARDIOVASCULAR SCREENING; LDL GOAL LESS THAN 160     Appendicitis, acute     Allergies   Allergen Reactions     Dairy [Milk Products]      No current outpatient medications on file.     Vitals: BP 92/64 (BP Location: Right arm, Cuff Size: Adult Regular)   Pulse 70   Resp 16   Ht 1.626 m (5' 4\")   Wt 56.2 kg (124 lb)   LMP 05/07/2019   SpO2 100%   BMI 21.28 kg/m    BMI= Body mass index is 21.28 kg/m .    EXAM:  GENERAL: healthy, alert and no distress   BREAST:  Right breast with two open wounds at the edge of the areola with granulation tissue and adipose tissue present at base of wound. Biopsy site laterally is not draining. There is induration anterior to the biopsy site. No erythema here. Erythema surrounding lateral areola open areas with some fluctuance in the middle of the two open areas.   CARDIOVASCULAR:  RRR  RESPIRATORY: nonlabored breathing  NECK: Neck supple. No adenopathy. Thyroid symmetric, normal size,, Carotids without bruits.  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 She has had some positive cultures and as such, I would recommend continuing bactrim for one week course as there continues to be erythema/induration. I also recommend surgical debridement of the middle portion of the lateral aspect of the breast where there is fluctuance. We " discussed doing this in clinic today or the OR. She would favor in the OR as it is very tender. This is reasonable. Will schedule for I&D. She just ate dinner prior to her appt and would not be able to do today. Will schedule for Tuesday or Wednesday next week. If improving, will cancel. If worsening over the weekend she understands to be seen in the ER.     Kerri Carrington MD  [facility]  [phone]        Please route or send letter to:  Primary Care Provider (PCP) and Referring Provider

## 2019-05-25 RX ORDER — CEFAZOLIN SODIUM 1 G/3ML
1 INJECTION, POWDER, FOR SOLUTION INTRAMUSCULAR; INTRAVENOUS SEE ADMIN INSTRUCTIONS
Status: CANCELLED | OUTPATIENT
Start: 2019-05-25

## 2019-05-25 RX ORDER — CEFAZOLIN SODIUM 2 G/100ML
2 INJECTION, SOLUTION INTRAVENOUS
Status: CANCELLED | OUTPATIENT
Start: 2019-05-25

## 2019-05-28 ENCOUNTER — ANESTHESIA (OUTPATIENT)
Dept: SURGERY | Facility: CLINIC | Age: 34
End: 2019-05-28
Payer: COMMERCIAL

## 2019-05-28 ENCOUNTER — APPOINTMENT (OUTPATIENT)
Dept: SURGERY | Facility: PHYSICIAN GROUP | Age: 34
End: 2019-05-28
Payer: COMMERCIAL

## 2019-05-28 ENCOUNTER — HOSPITAL ENCOUNTER (OUTPATIENT)
Facility: CLINIC | Age: 34
Discharge: HOME | End: 2019-05-28
Attending: SURGERY | Admitting: SURGERY
Payer: COMMERCIAL

## 2019-05-28 ENCOUNTER — ANESTHESIA EVENT (OUTPATIENT)
Dept: SURGERY | Facility: CLINIC | Age: 34
End: 2019-05-28
Payer: COMMERCIAL

## 2019-05-28 VITALS
BODY MASS INDEX: 21.53 KG/M2 | RESPIRATION RATE: 16 BRPM | DIASTOLIC BLOOD PRESSURE: 82 MMHG | WEIGHT: 126.1 LBS | OXYGEN SATURATION: 94 % | TEMPERATURE: 98.4 F | SYSTOLIC BLOOD PRESSURE: 120 MMHG | HEART RATE: 60 BPM | HEIGHT: 64 IN

## 2019-05-28 DIAGNOSIS — N61.1 BREAST ABSCESS: Primary | ICD-10-CM

## 2019-05-28 LAB — HCG UR QL: NEGATIVE

## 2019-05-28 PROCEDURE — 88305 TISSUE EXAM BY PATHOLOGIST: CPT | Performed by: SURGERY

## 2019-05-28 PROCEDURE — 36000052 ZZH SURGERY LEVEL 2 EA 15 ADDTL MIN: Performed by: SURGERY

## 2019-05-28 PROCEDURE — 81025 URINE PREGNANCY TEST: CPT | Performed by: SURGERY

## 2019-05-28 PROCEDURE — 71000027 ZZH RECOVERY PHASE 2 EACH 15 MINS: Performed by: SURGERY

## 2019-05-28 PROCEDURE — 71000013 ZZH RECOVERY PHASE 1 LEVEL 1 EA ADDTL HR: Performed by: SURGERY

## 2019-05-28 PROCEDURE — 25000128 H RX IP 250 OP 636: Performed by: NURSE ANESTHETIST, CERTIFIED REGISTERED

## 2019-05-28 PROCEDURE — 27210794 ZZH OR GENERAL SUPPLY STERILE: Performed by: SURGERY

## 2019-05-28 PROCEDURE — 36000054 ZZH SURGERY LEVEL 2 W FLUORO 1ST 30 MIN: Performed by: SURGERY

## 2019-05-28 PROCEDURE — 37000008 ZZH ANESTHESIA TECHNICAL FEE, 1ST 30 MIN: Performed by: SURGERY

## 2019-05-28 PROCEDURE — 40000170 ZZH STATISTIC PRE-PROCEDURE ASSESSMENT II: Performed by: SURGERY

## 2019-05-28 PROCEDURE — 88305 TISSUE EXAM BY PATHOLOGIST: CPT | Mod: 26 | Performed by: SURGERY

## 2019-05-28 PROCEDURE — 10060 I&D ABSCESS SIMPLE/SINGLE: CPT | Performed by: SURGERY

## 2019-05-28 PROCEDURE — 71000012 ZZH RECOVERY PHASE 1 LEVEL 1 FIRST HR: Performed by: SURGERY

## 2019-05-28 PROCEDURE — 88312 SPECIAL STAINS GROUP 1: CPT | Mod: 26 | Performed by: SURGERY

## 2019-05-28 PROCEDURE — 25000132 ZZH RX MED GY IP 250 OP 250 PS 637: Performed by: PHYSICIAN ASSISTANT

## 2019-05-28 PROCEDURE — 88312 SPECIAL STAINS GROUP 1: CPT | Performed by: SURGERY

## 2019-05-28 PROCEDURE — 25000125 ZZHC RX 250: Performed by: NURSE ANESTHETIST, CERTIFIED REGISTERED

## 2019-05-28 PROCEDURE — 87070 CULTURE OTHR SPECIMN AEROBIC: CPT | Performed by: SURGERY

## 2019-05-28 PROCEDURE — 87077 CULTURE AEROBIC IDENTIFY: CPT | Performed by: SURGERY

## 2019-05-28 PROCEDURE — 25800030 ZZH RX IP 258 OP 636: Performed by: NURSE ANESTHETIST, CERTIFIED REGISTERED

## 2019-05-28 PROCEDURE — 25000125 ZZHC RX 250: Performed by: SURGERY

## 2019-05-28 PROCEDURE — 37000009 ZZH ANESTHESIA TECHNICAL FEE, EACH ADDTL 15 MIN: Performed by: SURGERY

## 2019-05-28 PROCEDURE — 87075 CULTR BACTERIA EXCEPT BLOOD: CPT | Performed by: SURGERY

## 2019-05-28 PROCEDURE — 25000125 ZZHC RX 250: Performed by: ANESTHESIOLOGY

## 2019-05-28 PROCEDURE — 25000128 H RX IP 250 OP 636: Performed by: ANESTHESIOLOGY

## 2019-05-28 RX ORDER — NALOXONE HYDROCHLORIDE 0.4 MG/ML
.1-.4 INJECTION, SOLUTION INTRAMUSCULAR; INTRAVENOUS; SUBCUTANEOUS
Status: DISCONTINUED | OUTPATIENT
Start: 2019-05-28 | End: 2019-05-28 | Stop reason: HOSPADM

## 2019-05-28 RX ORDER — HYDROCODONE BITARTRATE AND ACETAMINOPHEN 7.5; 325 MG/15ML; MG/15ML
10 SOLUTION ORAL ONCE
Status: COMPLETED | OUTPATIENT
Start: 2019-05-28 | End: 2019-05-28

## 2019-05-28 RX ORDER — HYDROCODONE BITARTRATE AND ACETAMINOPHEN 5; 325 MG/1; MG/1
1-2 TABLET ORAL EVERY 4 HOURS PRN
Qty: 20 TABLET | Refills: 0 | Status: SHIPPED | OUTPATIENT
Start: 2019-05-28 | End: 2019-05-28

## 2019-05-28 RX ORDER — SODIUM CHLORIDE, SODIUM LACTATE, POTASSIUM CHLORIDE, CALCIUM CHLORIDE 600; 310; 30; 20 MG/100ML; MG/100ML; MG/100ML; MG/100ML
INJECTION, SOLUTION INTRAVENOUS CONTINUOUS
Status: DISCONTINUED | OUTPATIENT
Start: 2019-05-28 | End: 2019-05-28 | Stop reason: HOSPADM

## 2019-05-28 RX ORDER — HYDROCODONE BITARTRATE AND ACETAMINOPHEN 5; 325 MG/1; MG/1
1-2 TABLET ORAL
Status: DISCONTINUED | OUTPATIENT
Start: 2019-05-28 | End: 2019-05-28

## 2019-05-28 RX ORDER — DEXAMETHASONE SODIUM PHOSPHATE 4 MG/ML
INJECTION, SOLUTION INTRA-ARTICULAR; INTRALESIONAL; INTRAMUSCULAR; INTRAVENOUS; SOFT TISSUE PRN
Status: DISCONTINUED | OUTPATIENT
Start: 2019-05-28 | End: 2019-05-28

## 2019-05-28 RX ORDER — ONDANSETRON 4 MG/1
4 TABLET, ORALLY DISINTEGRATING ORAL EVERY 30 MIN PRN
Status: DISCONTINUED | OUTPATIENT
Start: 2019-05-28 | End: 2019-05-28 | Stop reason: HOSPADM

## 2019-05-28 RX ORDER — ONDANSETRON 2 MG/ML
INJECTION INTRAMUSCULAR; INTRAVENOUS PRN
Status: DISCONTINUED | OUTPATIENT
Start: 2019-05-28 | End: 2019-05-28

## 2019-05-28 RX ORDER — ONDANSETRON 2 MG/ML
4 INJECTION INTRAMUSCULAR; INTRAVENOUS EVERY 30 MIN PRN
Status: DISCONTINUED | OUTPATIENT
Start: 2019-05-28 | End: 2019-05-28 | Stop reason: HOSPADM

## 2019-05-28 RX ORDER — FENTANYL CITRATE 50 UG/ML
25-50 INJECTION, SOLUTION INTRAMUSCULAR; INTRAVENOUS
Status: CANCELLED | OUTPATIENT
Start: 2019-05-28

## 2019-05-28 RX ORDER — FENTANYL CITRATE 50 UG/ML
25-50 INJECTION, SOLUTION INTRAMUSCULAR; INTRAVENOUS
Status: DISCONTINUED | OUTPATIENT
Start: 2019-05-28 | End: 2019-05-28 | Stop reason: HOSPADM

## 2019-05-28 RX ORDER — MEPERIDINE HYDROCHLORIDE 25 MG/ML
12.5 INJECTION INTRAMUSCULAR; INTRAVENOUS; SUBCUTANEOUS
Status: DISCONTINUED | OUTPATIENT
Start: 2019-05-28 | End: 2019-05-28 | Stop reason: HOSPADM

## 2019-05-28 RX ORDER — HYDROMORPHONE HYDROCHLORIDE 1 MG/ML
.3-.5 INJECTION, SOLUTION INTRAMUSCULAR; INTRAVENOUS; SUBCUTANEOUS EVERY 10 MIN PRN
Status: DISCONTINUED | OUTPATIENT
Start: 2019-05-28 | End: 2019-05-28 | Stop reason: HOSPADM

## 2019-05-28 RX ORDER — SODIUM CHLORIDE, SODIUM LACTATE, POTASSIUM CHLORIDE, CALCIUM CHLORIDE 600; 310; 30; 20 MG/100ML; MG/100ML; MG/100ML; MG/100ML
INJECTION, SOLUTION INTRAVENOUS CONTINUOUS PRN
Status: DISCONTINUED | OUTPATIENT
Start: 2019-05-28 | End: 2019-05-28

## 2019-05-28 RX ORDER — CEFAZOLIN SODIUM 2 G/100ML
INJECTION, SOLUTION INTRAVENOUS PRN
Status: DISCONTINUED | OUTPATIENT
Start: 2019-05-28 | End: 2019-05-28

## 2019-05-28 RX ORDER — PROPOFOL 10 MG/ML
INJECTION, EMULSION INTRAVENOUS PRN
Status: DISCONTINUED | OUTPATIENT
Start: 2019-05-28 | End: 2019-05-28

## 2019-05-28 RX ORDER — AMOXICILLIN 250 MG
1-2 CAPSULE ORAL 2 TIMES DAILY PRN
Qty: 20 TABLET | Refills: 0 | Status: SHIPPED | OUTPATIENT
Start: 2019-05-28 | End: 2019-05-28

## 2019-05-28 RX ORDER — HYDROCODONE BITARTRATE AND ACETAMINOPHEN 7.5; 325 MG/15ML; MG/15ML
10 SOLUTION ORAL
Status: DISCONTINUED | OUTPATIENT
Start: 2019-05-28 | End: 2019-05-28 | Stop reason: HOSPADM

## 2019-05-28 RX ORDER — SCOLOPAMINE TRANSDERMAL SYSTEM 1 MG/1
1 PATCH, EXTENDED RELEASE TRANSDERMAL ONCE
Status: COMPLETED | OUTPATIENT
Start: 2019-05-28 | End: 2019-05-28

## 2019-05-28 RX ORDER — ACETAMINOPHEN 160 MG/5ML
650 LIQUID ORAL EVERY 4 HOURS PRN
Qty: 473 ML | Refills: 1 | Status: SHIPPED | OUTPATIENT
Start: 2019-05-28 | End: 2019-05-28

## 2019-05-28 RX ORDER — FENTANYL CITRATE 50 UG/ML
INJECTION, SOLUTION INTRAMUSCULAR; INTRAVENOUS PRN
Status: DISCONTINUED | OUTPATIENT
Start: 2019-05-28 | End: 2019-05-28

## 2019-05-28 RX ORDER — PROPOFOL 10 MG/ML
INJECTION, EMULSION INTRAVENOUS CONTINUOUS PRN
Status: DISCONTINUED | OUTPATIENT
Start: 2019-05-28 | End: 2019-05-28

## 2019-05-28 RX ORDER — LIDOCAINE HYDROCHLORIDE 20 MG/ML
INJECTION, SOLUTION INFILTRATION; PERINEURAL PRN
Status: DISCONTINUED | OUTPATIENT
Start: 2019-05-28 | End: 2019-05-28

## 2019-05-28 RX ORDER — MAGNESIUM HYDROXIDE 1200 MG/15ML
LIQUID ORAL PRN
Status: DISCONTINUED | OUTPATIENT
Start: 2019-05-28 | End: 2019-05-28 | Stop reason: HOSPADM

## 2019-05-28 RX ORDER — HYDROCODONE BITARTRATE AND ACETAMINOPHEN 7.5; 325 MG/15ML; MG/15ML
10-15 SOLUTION ORAL EVERY 4 HOURS PRN
Qty: 118 ML | Refills: 0 | Status: SHIPPED | OUTPATIENT
Start: 2019-05-28 | End: 2019-06-18

## 2019-05-28 RX ADMIN — SODIUM CHLORIDE, POTASSIUM CHLORIDE, SODIUM LACTATE AND CALCIUM CHLORIDE: 600; 310; 30; 20 INJECTION, SOLUTION INTRAVENOUS at 15:40

## 2019-05-28 RX ADMIN — ONDANSETRON 4 MG: 2 INJECTION INTRAMUSCULAR; INTRAVENOUS at 15:46

## 2019-05-28 RX ADMIN — DEXAMETHASONE SODIUM PHOSPHATE 4 MG: 4 INJECTION, SOLUTION INTRA-ARTICULAR; INTRALESIONAL; INTRAMUSCULAR; INTRAVENOUS; SOFT TISSUE at 15:46

## 2019-05-28 RX ADMIN — SCOPALAMINE 1 PATCH: 1 PATCH, EXTENDED RELEASE TRANSDERMAL at 15:35

## 2019-05-28 RX ADMIN — FENTANYL CITRATE 50 MCG: 50 INJECTION, SOLUTION INTRAMUSCULAR; INTRAVENOUS at 16:40

## 2019-05-28 RX ADMIN — HYDROMORPHONE HYDROCHLORIDE 0.5 MG: 1 INJECTION, SOLUTION INTRAMUSCULAR; INTRAVENOUS; SUBCUTANEOUS at 17:26

## 2019-05-28 RX ADMIN — LIDOCAINE HYDROCHLORIDE 100 MG: 20 INJECTION, SOLUTION INFILTRATION; PERINEURAL at 15:42

## 2019-05-28 RX ADMIN — HYDROMORPHONE HYDROCHLORIDE 0.5 MG: 1 INJECTION, SOLUTION INTRAMUSCULAR; INTRAVENOUS; SUBCUTANEOUS at 17:43

## 2019-05-28 RX ADMIN — PROPOFOL 150 MCG/KG/MIN: 10 INJECTION, EMULSION INTRAVENOUS at 15:42

## 2019-05-28 RX ADMIN — HYDROCODONE BITARTRATE AND ACETAMINOPHEN 15 ML: 7.5; 325 SOLUTION ORAL at 17:34

## 2019-05-28 RX ADMIN — FENTANYL CITRATE 25 MCG: 50 INJECTION, SOLUTION INTRAMUSCULAR; INTRAVENOUS at 16:51

## 2019-05-28 RX ADMIN — PROPOFOL 200 MG: 10 INJECTION, EMULSION INTRAVENOUS at 15:42

## 2019-05-28 RX ADMIN — FENTANYL CITRATE 50 MCG: 50 INJECTION, SOLUTION INTRAMUSCULAR; INTRAVENOUS at 15:42

## 2019-05-28 RX ADMIN — FENTANYL CITRATE 50 MCG: 50 INJECTION, SOLUTION INTRAMUSCULAR; INTRAVENOUS at 16:02

## 2019-05-28 RX ADMIN — CEFAZOLIN SODIUM 2 G: 2 INJECTION, SOLUTION INTRAVENOUS at 15:47

## 2019-05-28 RX ADMIN — MIDAZOLAM 2 MG: 1 INJECTION INTRAMUSCULAR; INTRAVENOUS at 15:40

## 2019-05-28 ASSESSMENT — LIFESTYLE VARIABLES: TOBACCO_USE: 0

## 2019-05-28 ASSESSMENT — COPD QUESTIONNAIRES: COPD: 0

## 2019-05-28 ASSESSMENT — MIFFLIN-ST. JEOR: SCORE: 1261.99

## 2019-05-28 NOTE — ANESTHESIA POSTPROCEDURE EVALUATION
Patient: Kianna Gore    Procedure(s):  INCISION AND DRAINAGE, BREAST RIGHT    Diagnosis:RIGHT BREAST ABSCESS  Diagnosis Additional Information: No value filed.    Anesthesia Type:  General, LMA    Note:  Anesthesia Post Evaluation    Patient location during evaluation: PACU  Patient participation: Able to fully participate in evaluation  Level of consciousness: awake and alert  Pain management: adequate  Airway patency: patent  Cardiovascular status: acceptable  Respiratory status: acceptable  Hydration status: acceptable  PONV: none     Anesthetic complications: None          Last vitals:  Vitals:    05/28/19 1645 05/28/19 1700 05/28/19 1715   BP: 108/76 110/71 120/82   Pulse: 60 63 60   Resp: 11 12 16   Temp: 37.1  C (98.8  F) 37  C (98.6  F) 36.9  C (98.4  F)   SpO2: 95% 95% 98%         Electronically Signed By: Karina Hernandez MD  May 28, 2019  5:59 PM

## 2019-05-28 NOTE — DISCHARGE INSTRUCTIONS
Information for Patients Discharging with a Transderm Scopolamine Patch     Dry mouth is a common side effect.    Drowsiness is another common side effect especially when combined with pain medication.  Please avoid activities that require mental alertness such as driving a car or making important legal decisions.    Since Scopolamine can cause temporary dilation of the pupils and blurred vision if it comes in contact with the eyes; be sure to wash your hands thoroughly with soap and water immediately after handling the patch.   When you remove your patch, please stick it to a tissue or paper towel for disposal.      Remove the patch immediately and contact a physician in the unlikely event that you experience symptoms of acute glaucoma (pain and reddening of the eyes, accompanied by dilated pupils).    Remove the patch if you develop any difficulties urinating.  If you cannot urinate after removing your patch, please notify your surgeon.    Remove the patch 24 hours after surgery.    Same Day Surgery Discharge Instructions for  Sedation and General Anesthesia       It's not unusual to feel dizzy, light-headed or faint for up to 24 hours after surgery or while taking pain medication.  If you have these symptoms: sit for a few minutes before standing and have someone assist you when you get up to walk or use the bathroom.      You should rest and relax for the next 24 hours. We recommend you make arrangements to have an adult stay with you for at least 24 hours after your discharge.  Avoid hazardous and strenuous activity.      DO NOT DRIVE any vehicle or operate mechanical equipment for 24 hours following the end of your surgery.  Even though you may feel normal, your reactions may be affected by the medication you have received.      Do not drink alcoholic beverages for 24 hours following surgery.       Slowly progress to your regular diet as you feel able. It's not unusual to feel nauseated and/or vomit after  receiving anesthesia.  If you develop these symptoms, drink clear liquids (apple juice, ginger ale, broth, 7-up, etc. ) until you feel better.  If your nausea and vomiting persists for 24 hours, please notify your surgeon.        All narcotic pain medications, along with inactivity and anesthesia, can cause constipation. Drinking plenty of liquids and increasing fiber intake will help.      For any questions of a medical nature, call your surgeon.      Do not make important decisions for 24 hours.      If you had general anesthesia, you may have a sore throat for a couple of days related to the breathing tube used during surgery.  You may use Cepacol lozenges to help with this discomfort.  If it worsens or if you develop a fever, contact your surgeon.       If you feel your pain is not well managed with the pain medications prescribed by your surgeon, please contact your surgeon's office to let them know so they can address your concerns.       [facility]  Discharge Instructions: Post-Operative Incision and Drainage of Breast Abscess    ACTIVITY    Take frequent short walks and increase your activity gradually.      Avoid strenuous physical activity or heavy lifting greater than 15-20 lbs. for 1-2 weeks with arm on the surgery side.  You may climb stairs.    Gentle rotation and stretching of your arms and shoulders will prevent joint stiffness.    You may drive without restrictions when you are not using any prescription pain medication and feel comfortable in a car.    You may return to work/school when you are comfortable without any prescription pain medication.     WOUND CARE    Leave ACE wrap and dressings in place until tomorrow, 5/29/19. Remove all dressings and packing from both sites. You may shower normally while packing is removed. Pat skin dry.    Change packing once every day. For the wound adjacent to your nipple, use a long cotton-tipped applicator to fill the  space with the packing tape. Trim the tape so that a short tail is sticking out of your skin. Do the same with the lateral (away from your midline) smaller incision. You can gently pack this - you do not need to repack this with the long length of tape that Dr. Carrington applied in the OR. A small amount of tape into this wound will be sufficient going forward. Cover both wounds/incisions with several pieces of 4x4 gauze and medipore tape.     Reapply the ACE wrap after performing dressing change if comfortable, or wear a sports bra, 24 hours per day except when shower/changing dressings.    DIET    Start with liquids, then gradually resume your regular diet as tolerated.     Drink plenty of liquids to stay hydrated.    PAIN    Expect some tenderness and discomfort at the incision site(s).  Use the prescribed pain medication at your discretion.  Expect gradual resolution of your pain over several days.    Do not take NSAIDs (ibuprofen, aspirin, aleve, naproxen, advil, motrin, celebrex)      Do not drink alcohol or drive while you are taking pain medications.    You may apply ice to your incisions in 20 minute intervals as needed for the next 48 hours.      EXPECTATIONS    Pain medications can cause constipation.  Limit use when possible.  Take over the counter stool softener/stimulant, such as Colace or Senna, 1-2 times a day with plenty of water.  You may take a mild over the counter laxative, such as Miralax or a suppository, as needed.      You may discontinue these medications once you are having regular bowel movements and/or are no longer taking your narcotic pain medication.    RETURN APPOINTMENT    Follow up with Dr. Carrington next week in clinic.  Please call the office at [phone] to schedule your appointment.      CALL OUR OFFICE -[phone] IF YOU HAVE:     Chills or fever above 101 F.    Increased redness, warmth, or drainage at your incisions.    Significant bleeding.    Pain not relieved by  your pain medication or rest.    Increasing pain after the first 48 hours.    Any other concerns or questions.    Revised October 2018     **If you have concerns or questions about your procedure,    please contact Dr Carrington at  [phone]**

## 2019-05-28 NOTE — BRIEF OP NOTE
"   [facility]  Brief Operative Note    Pre-operative diagnosis: RIGHT BREAST ABSCESS  Post-operative diagnosis: Same    Procedure:  INCISION AND DRAINAGE RIGHT BREAST    Surgeon:     * Kerri Carrington MD - Primary     * Sofiya Francois PA-C - Assisting    Anesthesia: General   Estimated blood loss: 20 mL    Specimens:   ID Type Source Tests Collected by Time Destination   1 : RIGHT BREAST ABSCESS Abscess Breast, Right ABSCESS CULTURE AEROBIC BACTERIAL, ANAEROBIC BACTERIAL CULTURE Kerri Carrington MD 5/28/2019  4:00 PM    A :  Tissue Breast, Right SURGICAL PATHOLOGY EXAM Kerri Carrington MD 5/28/2019  4:05 PM      Findings:  Lateral fluid collection drained and opened, sent for culture. Tissue excised at areolar wound, sent for tissue culture. Both sites packed with 1/4\" plain packing tape. Covered with gauze and tape. No immediate complications. See operative report for full details.      Sofiya Francois PA-C  [facility]  [phone]          "

## 2019-05-28 NOTE — ANESTHESIA CARE TRANSFER NOTE
Patient: Kianna Gore    Procedure(s):  INCISION AND DRAINAGE, BREAST RIGHT    Diagnosis: RIGHT BREAST ABSCESS  Diagnosis Additional Information: No value filed.    Anesthesia Type:   No value filed.     Note:  Airway :Face Mask  Patient transferred to:PACU  Comments: Vicente Report: Identifed the Patient, Identified the Reponsible Provider, Reviewed the pertinent medical history, Discussed the surgical course, Reviewed Intra-OP anesthesia mangement and issues during anesthesia, Set expectations for post-procedure period and Allowed opportunity for questions and acknowledgement of understanding      Vitals: (Last set prior to Anesthesia Care Transfer)              Electronically Signed By: BRYON Maynard CRNA  May 28, 2019  2:51 PM

## 2019-05-28 NOTE — ANESTHESIA CARE TRANSFER NOTE
Patient: Kianna Gore    Procedure(s):  INCISION AND DRAINAGE, BREAST RIGHT    Diagnosis: RIGHT BREAST ABSCESS  Diagnosis Additional Information: No value filed.    Anesthesia Type:   General, LMA     Note:  Airway :Face Mask  Patient transferred to:PACU  Handoff Report: Identifed the Patient, Identified the Reponsible Provider, Reviewed the pertinent medical history, Discussed the surgical course, Reviewed Intra-OP anesthesia mangement and issues during anesthesia, Set expectations for post-procedure period and Allowed opportunity for questions and acknowledgement of understanding      Vitals: (Last set prior to Anesthesia Care Transfer)    CRNA VITALS  5/28/2019 1549 - 5/28/2019 1623      5/28/2019             Pulse:  65    SpO2:  99 %    Resp Rate (set):  10                Electronically Signed By: BRYON Thomas CRNA  May 28, 2019  4:23 PM

## 2019-05-28 NOTE — ANESTHESIA PREPROCEDURE EVALUATION
Anesthesia Pre-Procedure Evaluation    Patient: Kianna Gore   MRN: [id number] : 1985          Preoperative Diagnosis: RIGHT BREAST ABSCESS    Procedure(s):  INCISION AND DRAINAGE, BREAST RIGHT    Past Medical History:   Diagnosis Date     NO ACTIVE PROBLEMS      Past Surgical History:   Procedure Laterality Date     HC EXCISION BREAST LESION, OPEN >=1  2009    Rt. Breast/  Fibroadenoma     HC REMOVE TONSILS/ADENOIDS,12+ Y/O      T & A 12+y.o.     LAPAROSCOPIC APPENDECTOMY  3/14/2012    Procedure:LAPAROSCOPIC APPENDECTOMY; Laparoscopic Appendectomy; Surgeon:ANTHONY MORAN; Location:[facility] OR     SURGICAL HISTORY OF -       Breast Augmentation       Anesthesia Evaluation     . Pt has had prior anesthetic. Type: General    No history of anesthetic complications          ROS/MED HX    ENT/Pulmonary:      (-) tobacco use, asthma, COPD, sleep apnea and recent URI   Neurologic:     (+)migraines,     Cardiovascular:        (-) hypertension and CAD   METS/Exercise Tolerance:  >4 METS   Hematologic:  - neg hematologic  ROS       Musculoskeletal:         GI/Hepatic:        (-) GERD and liver disease   Renal/Genitourinary:      (-) renal disease   Endo:      (-) Type I DM and Type II DM   Psychiatric:         Infectious Disease:         Malignancy:         Other:                          Physical Exam  Normal systems: cardiovascular, pulmonary and dental    Airway   Mallampati: II  TM distance: >3 FB  Neck ROM: full    Dental   Comment: The patient denies any loose, chipped, or missing teeth.    Cardiovascular       Pulmonary             Lab Results   Component Value Date    WBC 5.7 2015    HGB 12.5 2015    HCT 38.1 2015     2015     2015    POTASSIUM 4.0 2015    CHLORIDE 107 2015    CO2 24 2015    BUN 6 (L) 2015    CR 0.93 2015    GLC 77 2015    GABBY 9.4 2015    ALBUMIN 4.5 2015    PROTTOTAL 7.9 2015     "ALT 18 05/28/2015    AST 9 05/28/2015    ALKPHOS 56 05/28/2015    BILITOTAL 0.5 05/28/2015    TSH 0.61 05/28/2015    HCG Negative 03/14/2012    HCGS Negative 05/25/2015       Preop Vitals  BP Readings from Last 3 Encounters:   05/24/19 92/64   05/15/19 100/60   05/08/19 104/62    Pulse Readings from Last 3 Encounters:   05/24/19 70   05/15/19 93   05/08/19 60      Resp Readings from Last 3 Encounters:   05/24/19 16   05/08/19 16   03/29/19 16    SpO2 Readings from Last 3 Encounters:   05/24/19 100%   05/08/19 98%   05/06/19 98%      Temp Readings from Last 1 Encounters:   05/06/19 36.8  C (98.3  F) (Oral)    Ht Readings from Last 1 Encounters:   05/24/19 1.626 m (5' 4\")      Wt Readings from Last 1 Encounters:   05/24/19 56.2 kg (124 lb)    Estimated body mass index is 21.28 kg/m  as calculated from the following:    Height as of 5/24/19: 1.626 m (5' 4\").    Weight as of 5/24/19: 56.2 kg (124 lb).       Anesthesia Plan      History & Physical Review  History and physical reviewed and following examination; no interval change.    ASA Status:  1 .    NPO Status:  > 8 hours    Plan for General and LMA with Intravenous induction. Maintenance will be TIVA.    PONV prophylaxis:  Ondansetron (or other 5HT-3) and Dexamethasone or Solumedrol       Postoperative Care  Postoperative pain management:  IV analgesics.      Consents  Anesthetic plan, risks, benefits and alternatives discussed with:  Patient..                 Aftab Fritz MD  "

## 2019-05-29 NOTE — OP NOTE
General Surgery Operative Note    PREOPERATIVE DIAGNOSIS: right breast abscess    POSTOPERATIVE DIAGNOSIS: right breast abscess    PROCEDURE: excisional debridement right breast abscess    SURGEON:  Kerri Carrington MD    ASSISTANT:  Sofiya Francois PA-C  The PA s assistance was medically necessary to provide adequate exposure in the operating field, maintain hemostasis, cutting suture, clamping and ligating bleeding vessels, and visualization of anatomic structures throughout the surgical procedure.       ANESTHESIA:  General.    BLOOD LOSS: 20ml    FINDINGS: large abscess cavity on the right lateral breast as well as breakdown of the periareolar skin with underlying abscess    INDICATIONS:   Kianna is a 33yof who originally presented with a right breast abscess. She was treated with antibiotics without resolution. She had an ultrasound and ultrasound guided aspiration with radiology with drainage of a large amount of purulent fluid. She saw me in clinic for follow up last Friday and unfortunately had skin breakdown with persistent abscess. We discussed options and she elected to proceed with operative debridement.     DETAILS OF PROCEDURE: The patient was brought to the operating room per Anesthesia, placed in supine position, and intubated without difficulty. The right breast was prepped with betadine and draped in sterile fashion. She was given perioperative abx. A Surgical timeout was then performed, verifying the correct surgeon, site, procedure, and patient and all in the room were in agreement.     On the right breast on the lateral aspect of the areola there were two areas of open skin with thin bridging skin present with underlying fluctuance. This bridging skin was excised including underlying breast tissue and sent to pathology for review. There was purulent fluid noted and the breast tissue was very friable. The wound was thoroughly irrigated. Cautery was used for hemostasis. Sterile ultrasound was  then used to assess the right lateral breast. There was a 3cm fluid collection present near her biopsy site. This was initially drained with needle drainage; however, drainage was incomplete. I then elected to make an incision over this area. More drainage was seen after incision made. The wound was probed and completely open. The wound was then packed with 1/4 inch packing gauze. The periareolar wound was then readdressed. The wound was gaping and large and I elected to close the superior and inferior aspects to make it more manageable. 3-0 vicryl sutures were used to re approximate the skin. The center was left open. The wound was then packed with 1/4 inch packing gauze and covered with dry gauze. She was wrapped with an ACE wrap. She tolerated the procedure well. All instrument, needle and sponge counts were correct at the conclusion of the procedure.       Kerri Carrington MD

## 2019-06-02 LAB
BACTERIA SPEC CULT: NO GROWTH
SPECIMEN SOURCE: NORMAL

## 2019-06-03 LAB — COPATH REPORT: NORMAL

## 2019-06-06 ENCOUNTER — OFFICE VISIT (OUTPATIENT)
Dept: SURGERY | Facility: CLINIC | Age: 34
End: 2019-06-06
Payer: COMMERCIAL

## 2019-06-06 VITALS
BODY MASS INDEX: 20.74 KG/M2 | WEIGHT: 120.8 LBS | OXYGEN SATURATION: 99 % | DIASTOLIC BLOOD PRESSURE: 72 MMHG | SYSTOLIC BLOOD PRESSURE: 107 MMHG | TEMPERATURE: 98.4 F | HEART RATE: 73 BPM

## 2019-06-06 DIAGNOSIS — N61.20 GRANULOMATOUS MASTITIS: Primary | ICD-10-CM

## 2019-06-06 LAB
BACTERIA SPEC CULT: ABNORMAL
Lab: ABNORMAL
SPECIMEN SOURCE: ABNORMAL

## 2019-06-06 PROCEDURE — 99024 POSTOP FOLLOW-UP VISIT: CPT | Performed by: SURGERY

## 2019-06-06 RX ORDER — PREDNISONE 5 MG/ML
30 SOLUTION ORAL DAILY
Qty: 500 ML | Refills: 0 | Status: SHIPPED | OUTPATIENT
Start: 2019-06-06 | End: 2019-06-18

## 2019-06-06 ASSESSMENT — PAIN SCALES - GENERAL: PAINLEVEL: MILD PAIN (2)

## 2019-06-06 NOTE — LETTER
6/6/2019         RE: Kianna Gore  [address]  [address]  [place] [address]        Dear Colleague,    Thank you for referring your patient, Kianna Gore, to the [email or web address] Please see a copy of my visit note below.    [facility] Postoperative Note    S: Kianna reports she is doing fine since her surgery last week. She has been doing daily dressing changes with packing to the wound on the right breast. Pain is manageable. She has had ongoing clearish drainage from the biopsy site on the lateral breast. The lateral incision is healed.     Breasts: right breast periareolar incision with 1.5cm open wound with healthy granulation tissue at base. Significant improvement of surrounding erythema and edema. Yellowish fluid draining from lateral biopsy site with ongoing induration. Sutures removed from periareolar breast.     Pathology:   FINAL DIAGNOSIS:   Breast, right, tissue: Biopsy:   - Skin and underlying soft tissue with acute and chronic inflammation and   abundant histiocytic inflammation   with poorly formed nonnecrotizing granulomas, see comment     COMMENT:   The breast skin excision shows a marked inflammatory proliferation with   few poorly formed granulomas. A   definitive lobular centric granulomatous reaction is not seen. Overall,   the findings are not entirely specific   and the differential diagnosis includes changes adjacent to an abscess,   idiopathic granulomatous mastitis, and   cystic neutrophilic granulomatous mastitis. An AFB stain is still pending,    with the results to be reported in   an addendum.  AFB was negative.     A/P  Kianna Gore is recovering from a excisional debridement of right breast abscess on 5/28/2019. 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 I would like her to start prednisone given granulomatous mastitis and no evidence at this point of infection. She will start on 30mg  daily for two weeks. I will see her back in two weeks and will do a taper at that time pending symptoms. Her breast is healing well. She will continue wound cares with adaptic gauze over the open area on the breast with daily dressing changes.     Thank you for the opportunity to help in her care.    Kerri Carrington  [facility]  [phone]    Please route or send letter to:  Primary Care Provider (PCP) and Referring Provider      Again, thank you for allowing me to participate in the care of your patient.        Sincerely,        Kerri Carrington MD

## 2019-06-06 NOTE — PROGRESS NOTES
[facility] Postoperative Note    S: Kianna reports she is doing fine since her surgery last week. She has been doing daily dressing changes with packing to the wound on the right breast. Pain is manageable. She has had ongoing clearish drainage from the biopsy site on the lateral breast. The lateral incision is healed.     Breasts: right breast periareolar incision with 1.5cm open wound with healthy granulation tissue at base. Significant improvement of surrounding erythema and edema. Yellowish fluid draining from lateral biopsy site with ongoing induration. Sutures removed from periareolar breast.     Pathology:   FINAL DIAGNOSIS:   Breast, right, tissue: Biopsy:   - Skin and underlying soft tissue with acute and chronic inflammation and   abundant histiocytic inflammation   with poorly formed nonnecrotizing granulomas, see comment     COMMENT:   The breast skin excision shows a marked inflammatory proliferation with   few poorly formed granulomas. A   definitive lobular centric granulomatous reaction is not seen. Overall,   the findings are not entirely specific   and the differential diagnosis includes changes adjacent to an abscess,   idiopathic granulomatous mastitis, and   cystic neutrophilic granulomatous mastitis. An AFB stain is still pending,    with the results to be reported in   an addendum.  AFB was negative.     A/P  Kianna Gore is recovering from a excisional debridement of right breast abscess on 5/28/2019. 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 I would like her to start prednisone given granulomatous mastitis and no evidence at this point of infection. She will start on 30mg daily for two weeks. I will see her back in two weeks and will do a taper at that time pending symptoms. Her breast is healing well. She will continue wound cares with adaptic gauze over the open area on the breast with daily dressing changes.     Thank you for the  opportunity to help in her care.    Kerri Carrington  [facility]  [phone]    Please route or send letter to:  Primary Care Provider (PCP) and Referring Provider

## 2019-06-18 ENCOUNTER — OFFICE VISIT (OUTPATIENT)
Dept: SURGERY | Facility: CLINIC | Age: 34
End: 2019-06-18
Payer: COMMERCIAL

## 2019-06-18 ENCOUNTER — HOSPITAL ENCOUNTER (OUTPATIENT)
Dept: MAMMOGRAPHY | Facility: CLINIC | Age: 34
Discharge: HOME | End: 2019-06-18
Attending: SURGERY | Admitting: SURGERY
Payer: COMMERCIAL

## 2019-06-18 VITALS
OXYGEN SATURATION: 100 % | HEART RATE: 69 BPM | SYSTOLIC BLOOD PRESSURE: 102 MMHG | DIASTOLIC BLOOD PRESSURE: 74 MMHG | BODY MASS INDEX: 20.49 KG/M2 | HEIGHT: 64 IN | WEIGHT: 120 LBS

## 2019-06-18 DIAGNOSIS — N61.20 GRANULOMATOUS MASTITIS: ICD-10-CM

## 2019-06-18 DIAGNOSIS — N61.20 GRANULOMATOUS MASTITIS: Primary | ICD-10-CM

## 2019-06-18 DIAGNOSIS — Z09 FOLLOW-UP EXAM: ICD-10-CM

## 2019-06-18 PROCEDURE — 76642 ULTRASOUND BREAST LIMITED: CPT | Mod: RT

## 2019-06-18 PROCEDURE — 99024 POSTOP FOLLOW-UP VISIT: CPT | Performed by: SURGERY

## 2019-06-18 RX ORDER — PREDNISONE 5 MG/ML
30 SOLUTION ORAL DAILY
Qty: 500 ML | Refills: 0 | Status: SHIPPED | OUTPATIENT
Start: 2019-06-18 | End: 2019-07-12

## 2019-06-18 ASSESSMENT — MIFFLIN-ST. JEOR: SCORE: 1234.32

## 2019-06-18 NOTE — PROGRESS NOTES
[facility] Breast Surgery Postoperative Note    S: Kianna returns for follow up after right breast excisional debridement for large abscess related to granulomatous mastitis. She reports over the weekend she had some increased swelling and pain, but this has improved again. She is currently packing the periareolar incision with iodoform gauze. She has a small amount of drainage from the lateral breast biopsy site. She has been taking 30mg prednisone daily for two weeks now. She reports it makes her feel shaky.     Breasts: right breast lateral periareolar wound is healing well and significantly improved. Covered with bacitracin and telfa gauze. Lateral biopsy site with some purulent fluid with pressure and induration anterior to the biopsy site. No fluctuance on exam.     Pathology:     A/P  Kianna Gore is recovering from an excisional breast biopsy/I&D for abscess related to granulomatous mastitis. Her breast is overall significantly improved. Changed dressing to bacitracin and telfa. Will get US to evaluate for deeper fluid collection given the purulent drainage with pressure. Plan to continue prednisone 30mg daily for two more weeks and follow up with me at that time to discuss taper.     Thank you for the opportunity to help in her care.    Kerri Carrington  [facility]  [phone]    Please route or send letter to:  Primary Care Provider (PCP) and Referring Provider

## 2019-07-02 ENCOUNTER — OFFICE VISIT (OUTPATIENT)
Dept: SURGERY | Facility: CLINIC | Age: 34
End: 2019-07-02
Payer: COMMERCIAL

## 2019-07-02 DIAGNOSIS — Z09 SURGERY FOLLOW-UP EXAMINATION: Primary | ICD-10-CM

## 2019-07-02 PROCEDURE — 99212 OFFICE O/P EST SF 10 MIN: CPT | Performed by: SURGERY

## 2019-07-02 NOTE — PROGRESS NOTES
[facility] Breast Surgery Postoperative Note    S: Kianna reports her breast has improved considerably. She still has a small amount of drainage from the lateral core biopsy site. Minimal pain.     Breasts: right periareolar wound has healed well. 1cm open area remaining. Induration on the lateral breast is significantly improved. No fluctuance present.     A/P  Kianna Gore is recovering from an I&D of the right breast due to abscess secondary to granulomatous mastitis. She is currently on prednisone 30mg daily and is doing well. Will taper steroids now (20mg 2 days, 10mg 2 days, 5mg 2 days). She will call if she has worsening symptoms. I will see her back at the end of July. She is moving to [place] in August.     Thank you for the opportunity to help in her care.    Kerri Carrington  [facility]  [phone]    Please route or send letter to:  Primary Care Provider (PCP) and Referring Provider

## 2019-07-12 ENCOUNTER — OFFICE VISIT (OUTPATIENT)
Dept: SURGERY | Facility: CLINIC | Age: 34
End: 2019-07-12
Payer: COMMERCIAL

## 2019-07-12 ENCOUNTER — TELEPHONE (OUTPATIENT)
Dept: SURGERY | Facility: CLINIC | Age: 34
End: 2019-07-12

## 2019-07-12 VITALS
SYSTOLIC BLOOD PRESSURE: 120 MMHG | HEIGHT: 64 IN | BODY MASS INDEX: 20.49 KG/M2 | RESPIRATION RATE: 16 BRPM | WEIGHT: 120 LBS | DIASTOLIC BLOOD PRESSURE: 88 MMHG | HEART RATE: 90 BPM | OXYGEN SATURATION: 95 %

## 2019-07-12 DIAGNOSIS — N61.20 GRANULOMATOUS MASTITIS: Primary | ICD-10-CM

## 2019-07-12 PROCEDURE — 99213 OFFICE O/P EST LOW 20 MIN: CPT | Performed by: SURGERY

## 2019-07-12 RX ORDER — PREDNISONE 5 MG/ML
2.5 SOLUTION ORAL DAILY
Qty: 17.5 ML | Refills: 0 | Status: SHIPPED | OUTPATIENT
Start: 2019-07-12 | End: 2019-12-17

## 2019-07-12 RX ORDER — PREDNISONE 5 MG/ML
5 SOLUTION ORAL DAILY
Qty: 35 ML | Refills: 0 | Status: SHIPPED | OUTPATIENT
Start: 2019-07-12 | End: 2019-12-17

## 2019-07-12 RX ORDER — PREDNISONE 5 MG/ML
10 SOLUTION ORAL DAILY
Qty: 70 ML | Refills: 0 | Status: SHIPPED | OUTPATIENT
Start: 2019-07-12 | End: 2019-12-17

## 2019-07-12 RX ORDER — PREDNISONE 5 MG/ML
20 SOLUTION ORAL DAILY
Qty: 140 ML | Refills: 0 | Status: SHIPPED | OUTPATIENT
Start: 2019-07-12 | End: 2019-12-17

## 2019-07-12 ASSESSMENT — MIFFLIN-ST. JEOR: SCORE: 1234.32

## 2019-07-12 NOTE — TELEPHONE ENCOUNTER
Per [facility] pharmacist, will fill rx as one instead of 4 different prescriptions in order for patient to have one copay. Both parties in agreement of plan.    Marlena WIGGINSN, RN, OCN  Oncology Care Coordinator  [facility]  [phone]

## 2019-07-12 NOTE — TELEPHONE ENCOUNTER
Patient would like to speak to SEW or a nurse regarding side effects of going off of Prednisone.    Phone: [phone]    Message ok

## 2019-07-12 NOTE — PROGRESS NOTES
[facility] Breast Surgery Postoperative Note    S: Kianna returns as she has had worsening breast symptoms since decreasing her dose of prednisone and she is having joint aches and pains and overall feels flu like symptoms.     Breasts: slight increase in firmness on the lateral breast near the drain insertion site. No erythema. Wound healing well. Small serous drainage from biopsy site.         A/P  Kianna Gore has history of right breast granulomatous mastitis s/p I&D of large breast abscess. Wound had been healing well. Breast is now more firm after tapering prednisone. Plan for return to 20mg daily of prednisone for one week, then 10mg one week, 5mg one week and 2.5mg one week. She will see me again at the end of July for follow up.     Thank you for the opportunity to help in her care.    Kerri Carrington  [facility]  [phone]    Please route or send letter to:  Primary Care Provider (PCP) and Referring Provider

## 2019-07-12 NOTE — TELEPHONE ENCOUNTER
Pharmacist calling; is going to combine the 4 prednisone prescriptions into one, if this is an issue please let them know.    Ryan    Phone: [phone]

## 2019-07-12 NOTE — TELEPHONE ENCOUNTER
"Lilly called to report symptoms which she thought were related to the prednisone taper. She reports body aches, sore pectoral muscles, and right breast tenderness. There is a small amount of drainage from previous right breast biopsy. She reports the right breast \"doesn't look right\". She denies associated redness. She is afebrile.     Will have patient come in today for breast exam. Check in 12:30 at [facility] [place]. Kianna verbalized understanding.    Marlena WIGGINSN, RN, OCN  Oncology Care Coordinator  [facility]  [phone]    "

## 2019-07-26 ENCOUNTER — OFFICE VISIT (OUTPATIENT)
Dept: SURGERY | Facility: CLINIC | Age: 34
End: 2019-07-26
Payer: COMMERCIAL

## 2019-07-26 VITALS
RESPIRATION RATE: 16 BRPM | BODY MASS INDEX: 20.49 KG/M2 | HEART RATE: 68 BPM | SYSTOLIC BLOOD PRESSURE: 106 MMHG | DIASTOLIC BLOOD PRESSURE: 70 MMHG | HEIGHT: 64 IN | OXYGEN SATURATION: 100 % | WEIGHT: 120 LBS

## 2019-07-26 DIAGNOSIS — Z09 SURGICAL FOLLOWUP VISIT: Primary | ICD-10-CM

## 2019-07-26 PROCEDURE — 99213 OFFICE O/P EST LOW 20 MIN: CPT | Performed by: SURGERY

## 2019-07-26 ASSESSMENT — MIFFLIN-ST. JEOR: SCORE: 1234.32

## 2019-07-26 NOTE — PROGRESS NOTES
[facility] Follow Up Note    CHIEF COMPLAINT:  Granulomatous mastitis    HISTORY OF PRESENT ILLNESS:  Kianna Gore is a 33 year old female who is seen in follow up for right breast granulomatous mastitis. I had last seen her on 7/12 and had restarted prednisone as symptoms worsened with last wean. Today she reports she is doing well. She has not had breast pain. Breast is softer. Mild aches in bilateral knees.     REVIEW OF SYSTEMS:  Constitutional:  Negative for chills, fatigue, fever and weight change.  Eyes:  Negative for blurred vision, eye pain and photophobia.  ENT:  Negative for hearing problems, ENT pain, congestion, rhinorrhea, epistaxis, hoarseness and dental problems.  Cardiovascular:  Negative for chest pain, palpitations, tachycardia, orthopnea and edema.  Respiratory:  Negative for cough, dyspnea and hemoptysis.  Gastrointestinal:  Negative for abdominal pain, heartburn, constipation, diarrhea and stool changes.  Musculoskeletal:  Negative for arthralgias, back pain and myalgias.  Integumentary/Breast:  See HPI.    Past Medical History:   Diagnosis Date     Granulomatous mastitis     right sided       Past Surgical History:   Procedure Laterality Date     granulomatous mastitis Right      HC EXCISION BREAST LESION, OPEN >=1  9/8/2009    Rt. Breast/  Fibroadenoma     HC REMOVE TONSILS/ADENOIDS,12+ Y/O  2002    T & A 12+y.o.     INCISION AND DRAINAGE BREAST Right 5/28/2019    Procedure: INCISION AND DRAINAGE, BREAST RIGHT;  Surgeon: Kerri Carrington MD;  Location:  OR     LAPAROSCOPIC APPENDECTOMY  3/14/2012    Procedure:LAPAROSCOPIC APPENDECTOMY; Laparoscopic Appendectomy; Surgeon:ANTHONY MORAN; Location:[facility] OR     SURGICAL HISTORY OF -   2006    Breast Augmentation       Family History   Problem Relation Age of Onset     Neurologic Disorder Father         parkinsons     Neurologic Disorder Paternal Grandmother         parkinsons     Depression Sister      Anxiety Disorder  "Sister        Social History     Tobacco Use     Smoking status: Never Smoker     Smokeless tobacco: Never Used   Substance Use Topics     Alcohol use: Yes     Alcohol/week: 0.0 oz     Comment: rarely        Patient Active Problem List   Diagnosis     CARDIOVASCULAR SCREENING; LDL GOAL LESS THAN 160     Appendicitis, acute     Allergies   Allergen Reactions     Dairy [Milk Products]      No current outpatient medications on file.     Vitals: /70   Pulse 68   Resp 16   Ht 1.626 m (5' 4\")   Wt 54.4 kg (120 lb)   LMP 07/06/2019 (Approximate)   SpO2 100%   Breastfeeding? No   BMI 20.60 kg/m    BMI= Body mass index is 20.6 kg/m .    EXAM:  GENERAL: healthy, alert and no distress   BREAST:  Right breast periareolar wound is healed. Lateral biopsy site without drainage. Tissue between two areas is softer. No fluctuance. Left breast is normal.   There is no axillary or supraclavicular lymphadenopathy.  CARDIOVASCULAR:  RRR  RESPIRATORY: nonlabored breathing  NECK: Neck supple. No adenopathy. Thyroid symmetric, normal size,, Carotids without bruits.  SKIN: No suspicious lesions or rashes  LYMPH: Normal cervical lymph nodes      ASSESSMENT/PLAN:  Kianna Gore has history of right breast granulomatous mastitis s/p I&D of large breast abscess and now on prednisone taper and doing well with slower taper. Continue to taper as discussed, follow up with me in August to assess prior to her move.     Kerri Carrington MD  [facility]  [phone]        Please route or send letter to:  Primary Care Provider (PCP) and Referring Provider      "

## 2019-08-20 ENCOUNTER — HOSPITAL ENCOUNTER (OUTPATIENT)
Dept: LAB | Facility: CLINIC | Age: 34
Discharge: HOME | End: 2019-08-20
Attending: SURGERY | Admitting: SURGERY
Payer: COMMERCIAL

## 2019-08-20 ENCOUNTER — TELEPHONE (OUTPATIENT)
Dept: SURGERY | Facility: CLINIC | Age: 34
End: 2019-08-20

## 2019-08-20 ENCOUNTER — OFFICE VISIT (OUTPATIENT)
Dept: SURGERY | Facility: CLINIC | Age: 34
End: 2019-08-20
Payer: COMMERCIAL

## 2019-08-20 VITALS — HEIGHT: 64 IN | BODY MASS INDEX: 20.49 KG/M2 | HEART RATE: 65 BPM | WEIGHT: 120 LBS | OXYGEN SATURATION: 95 %

## 2019-08-20 DIAGNOSIS — N61.20 GRANULOMATOUS MASTITIS: Primary | ICD-10-CM

## 2019-08-20 DIAGNOSIS — Z09 FOLLOW-UP EXAMINATION: ICD-10-CM

## 2019-08-20 DIAGNOSIS — N61.20 GRANULOMATOUS MASTITIS: ICD-10-CM

## 2019-08-20 LAB
ALBUMIN SERPL-MCNC: 3.9 G/DL (ref 3.4–5)
ALP SERPL-CCNC: 77 U/L (ref 40–150)
ALT SERPL W P-5'-P-CCNC: 33 U/L (ref 0–50)
ANION GAP SERPL CALCULATED.3IONS-SCNC: 2 MMOL/L (ref 3–14)
AST SERPL W P-5'-P-CCNC: 35 U/L (ref 0–45)
BILIRUB SERPL-MCNC: 0.4 MG/DL (ref 0.2–1.3)
BUN SERPL-MCNC: 8 MG/DL (ref 7–30)
CALCIUM SERPL-MCNC: 9.2 MG/DL (ref 8.5–10.1)
CHLORIDE SERPL-SCNC: 107 MMOL/L (ref 94–109)
CO2 SERPL-SCNC: 31 MMOL/L (ref 20–32)
CREAT SERPL-MCNC: 0.64 MG/DL (ref 0.52–1.04)
GFR SERPL CREATININE-BSD FRML MDRD: >90 ML/MIN/{1.73_M2}
GLUCOSE SERPL-MCNC: 100 MG/DL (ref 70–99)
POTASSIUM SERPL-SCNC: 3.6 MMOL/L (ref 3.4–5.3)
PROT SERPL-MCNC: 7.7 G/DL (ref 6.8–8.8)
SODIUM SERPL-SCNC: 140 MMOL/L (ref 133–144)

## 2019-08-20 PROCEDURE — 99214 OFFICE O/P EST MOD 30 MIN: CPT | Performed by: SURGERY

## 2019-08-20 PROCEDURE — 36415 COLL VENOUS BLD VENIPUNCTURE: CPT | Performed by: SURGERY

## 2019-08-20 PROCEDURE — 80053 COMPREHEN METABOLIC PANEL: CPT | Performed by: SURGERY

## 2019-08-20 ASSESSMENT — MIFFLIN-ST. JEOR: SCORE: 1234.32

## 2019-08-20 NOTE — TELEPHONE ENCOUNTER
Prior Auth Approved: for lesser quantity. Internal case # [id number].    Pharmacy notified. Patient notified.     Marlena WIGGINSN, RN, OCN  Oncology Care Coordinator  [facility]  [phone]

## 2019-08-20 NOTE — TELEPHONE ENCOUNTER
Left message for Oscar, insurance only allows 30 day supply of methotrexate. Encouraged patient to call back with additional questions/concerns.    Marlena WIGGINSN, RN, OCN  Oncology Care Coordinator  [facility]  [phone]

## 2019-08-20 NOTE — TELEPHONE ENCOUNTER
Was prescribed methotrexate today feels that she got the  wrong quantity, not enough to last her two months, which she thought was the plan.    Please call     Phone:[phone]    Message ok

## 2019-08-20 NOTE — TELEPHONE ENCOUNTER
Name of caller: Patient    Reason for Call:  Patient needs a prior authorization for the medication prescribed today:  methotrexate (XATMEP) 2.5 MG/ML oral liquid CHEMO  Pharmacy needs a prior auth for the liquid version of this medication.  Please kelsie  pharmacy line at:  [phone] and let them know it is urgent so it can get done today.  Please kelsie patient to let her know what is happening.    Surgeon:  Dr. Carrington    Recent Surgery:  Yes.    If yes, when & what type:  5/28/19 Right breast incision and drain.      Best phone number to reach pt at is: [phone]    Ok to leave a message with medical info? Yes.    Pharmacy preferred (if calling for a refill):      [id number] [address]

## 2019-08-20 NOTE — PROGRESS NOTES
[facility]  Breast Center Follow Up Note    CHIEF COMPLAINT:  Granulomatous mastitis    HISTORY OF PRESENT ILLNESS: Kianna Gore is a 33 year old female who is seen in follow up for right breast granulomatous mastitis. I had last seen her on 7/26 and she was tapering her prednisone and tolerating well at that time. She is here for follow up prior to moving to [place] on Friday.     She reports for the past few days she has had increased pain in the right breast and some drainage from both the lateral biopsy site and from the periareolar incision. She has been off prednisone for two weeks now. She denies fevers and chills.     REVIEW OF SYSTEMS:  Constitutional:  Negative for chills, fatigue, fever and weight change.  Eyes:  Negative for blurred vision, eye pain and photophobia.  ENT:  Negative for hearing problems, ENT pain, congestion, rhinorrhea, epistaxis, hoarseness and dental problems.  Cardiovascular:  Negative for chest pain, palpitations, tachycardia, orthopnea and edema.  Respiratory:  Negative for cough, dyspnea and hemoptysis.  Gastrointestinal:  Negative for abdominal pain, heartburn, constipation, diarrhea and stool changes.  Musculoskeletal:  Negative for arthralgias, back pain and myalgias.  Integumentary/Breast:  See HPI.    Past Medical History:   Diagnosis Date     Granulomatous mastitis     right sided       Past Surgical History:   Procedure Laterality Date     granulomatous mastitis Right      HC EXCISION BREAST LESION, OPEN >=1  9/8/2009    Rt. Breast/  Fibroadenoma     HC REMOVE TONSILS/ADENOIDS,12+ Y/O  2002    T & A 12+y.o.     INCISION AND DRAINAGE BREAST Right 5/28/2019    Procedure: INCISION AND DRAINAGE, BREAST RIGHT;  Surgeon: Kerri Carrington MD;  Location:  OR     LAPAROSCOPIC APPENDECTOMY  3/14/2012    Procedure:LAPAROSCOPIC APPENDECTOMY; Laparoscopic Appendectomy; Surgeon:ANTHONY MORAN; Location: OR     SURGICAL HISTORY OF -   2006    Breast Augmentation       Family  "History   Problem Relation Age of Onset     Neurologic Disorder Father         parkinsons     Neurologic Disorder Paternal Grandmother         parkinsons     Depression Sister      Anxiety Disorder Sister        Social History     Tobacco Use     Smoking status: Never Smoker     Smokeless tobacco: Never Used   Substance Use Topics     Alcohol use: Yes     Alcohol/week: 0.0 oz     Comment: rarely        Patient Active Problem List   Diagnosis     CARDIOVASCULAR SCREENING; LDL GOAL LESS THAN 160     Appendicitis, acute     Allergies   Allergen Reactions     Dairy [Milk Products]      No current outpatient medications on file.     Vitals: Pulse 65   Ht 1.626 m (5' 4\")   Wt 54.4 kg (120 lb)   SpO2 95%   BMI 20.60 kg/m    BMI= Body mass index is 20.6 kg/m .    EXAM:  GENERAL: healthy, alert and no distress   BREAST:  The periareolar wound is well healed. There is some firmness underlying the incision and she is tender to palpation. No drainage currently. No underlying palpable fluid collection. The lateral biopsy site is also firm deep to the skin and tender. No skin erythema or induration.   There is no axillary or supraclavicular lymphadenopathy.  CARDIOVASCULAR:  RRR  RESPIRATORY: nonlabored breathing  NECK: Neck supple. No adenopathy. Thyroid symmetric, normal size,, Carotids without bruits.  SKIN: No suspicious lesions or rashes  LYMPH: Normal cervical lymph nodes      ASSESSMENT/PLAN:  Kianna Gore is a 33yof with granulomatous mastitis of the right breast. She has tried two courses of prednisone now and has improvement of symptoms on prednisone and recurrence of pain/swelling/drainage off prednisone. We discussed options. She could try another course of prednisone. She does not like to side effects of prednisone and is starting graduate school in [place] and does not want to be on it further. Another option is she could try methotrexate. 10mg once per week for 2-4 weeks is reasonable to see if she has " improvement. She will take folic acid while on methotrexate. We will check LFTs today and on Friday after one dose prior to her flight. She should establish care with a breast surgeon in [place] and if needs >1 month of treatment, recheck LFTs then. She will call with questions or concerns in the interim.  Another option we discussed is no further medical treatment and supportive cares with alternating heat/ice, prn NSAIDS, tylenol. She has quite a bit of discomfort on the breast and is not interested in this.     Kerri Carrington MD  [facility]  [phone]        Please route or send letter to:  Primary Care Provider (PCP) and Referring Provider

## 2019-08-23 ENCOUNTER — HOSPITAL ENCOUNTER (OUTPATIENT)
Dept: LAB | Facility: CLINIC | Age: 34
Discharge: HOME | End: 2019-08-23
Attending: SURGERY | Admitting: SURGERY
Payer: COMMERCIAL

## 2019-08-23 DIAGNOSIS — N61.20 GRANULOMATOUS MASTITIS: ICD-10-CM

## 2019-08-23 LAB
ALBUMIN SERPL-MCNC: 3.7 G/DL (ref 3.4–5)
ALP SERPL-CCNC: 77 U/L (ref 40–150)
ALT SERPL W P-5'-P-CCNC: 51 U/L (ref 0–50)
ANION GAP SERPL CALCULATED.3IONS-SCNC: 6 MMOL/L (ref 3–14)
AST SERPL W P-5'-P-CCNC: 33 U/L (ref 0–45)
BILIRUB SERPL-MCNC: 0.3 MG/DL (ref 0.2–1.3)
BUN SERPL-MCNC: 18 MG/DL (ref 7–30)
CALCIUM SERPL-MCNC: 9.3 MG/DL (ref 8.5–10.1)
CHLORIDE SERPL-SCNC: 106 MMOL/L (ref 94–109)
CO2 SERPL-SCNC: 28 MMOL/L (ref 20–32)
CREAT SERPL-MCNC: 0.78 MG/DL (ref 0.52–1.04)
GFR SERPL CREATININE-BSD FRML MDRD: >90 ML/MIN/{1.73_M2}
GLUCOSE SERPL-MCNC: 90 MG/DL (ref 70–99)
POTASSIUM SERPL-SCNC: 4 MMOL/L (ref 3.4–5.3)
PROT SERPL-MCNC: 7.6 G/DL (ref 6.8–8.8)
SODIUM SERPL-SCNC: 140 MMOL/L (ref 133–144)

## 2019-08-23 PROCEDURE — 80053 COMPREHEN METABOLIC PANEL: CPT | Performed by: SURGERY

## 2019-08-23 PROCEDURE — 36415 COLL VENOUS BLD VENIPUNCTURE: CPT | Performed by: SURGERY

## 2019-10-01 ENCOUNTER — HEALTH MAINTENANCE LETTER (OUTPATIENT)
Age: 34
End: 2019-10-01

## 2019-11-15 ENCOUNTER — CARE COORDINATION (OUTPATIENT)
Dept: SURGERY | Facility: CLINIC | Age: 34
End: 2019-11-15

## 2019-11-15 DIAGNOSIS — N61.20 GRANULOMATOUS MASTITIS: Primary | ICD-10-CM

## 2019-11-15 NOTE — PROGRESS NOTES
Right Breast Ultrasound prior to visit per Dr. Carrington. Orders entered. Will schedule and notify patient via [employer] message.    Marlena WIGGINSN, RN, OCN  Oncology Care Coordinator  [facility]  [facility]  Phone: [phone]

## 2019-12-17 ENCOUNTER — OFFICE VISIT (OUTPATIENT)
Dept: FAMILY MEDICINE | Facility: CLINIC | Age: 34
End: 2019-12-17
Payer: COMMERCIAL

## 2019-12-17 VITALS
SYSTOLIC BLOOD PRESSURE: 110 MMHG | OXYGEN SATURATION: 100 % | HEIGHT: 64 IN | DIASTOLIC BLOOD PRESSURE: 78 MMHG | WEIGHT: 128 LBS | HEART RATE: 67 BPM | BODY MASS INDEX: 21.85 KG/M2 | TEMPERATURE: 98 F

## 2019-12-17 DIAGNOSIS — N92.0 MENORRHAGIA WITH REGULAR CYCLE: Primary | ICD-10-CM

## 2019-12-17 DIAGNOSIS — G43.109 MIGRAINE WITH AURA AND WITHOUT STATUS MIGRAINOSUS, NOT INTRACTABLE: ICD-10-CM

## 2019-12-17 DIAGNOSIS — Z12.4 SCREENING FOR CERVICAL CANCER: ICD-10-CM

## 2019-12-17 PROCEDURE — 99214 OFFICE O/P EST MOD 30 MIN: CPT | Performed by: NURSE PRACTITIONER

## 2019-12-17 PROCEDURE — G0145 SCR C/V CYTO,THINLAYER,RESCR: HCPCS | Performed by: NURSE PRACTITIONER

## 2019-12-17 PROCEDURE — G0476 HPV COMBO ASSAY CA SCREEN: HCPCS | Performed by: NURSE PRACTITIONER

## 2019-12-17 ASSESSMENT — MIFFLIN-ST. JEOR: SCORE: 1270.6

## 2019-12-17 NOTE — PROGRESS NOTES
Subjective     Kianna Gore is a 33 year old female who presents to clinic today for the following health issues:    HPI     Vaginal Bleeding (Dysmenorrhea)  Onset: 2 years- progressively worse   Menstrual cycles are heavy and lasting a full week.   +Cramping and fatigue.  +Migraines with menstrual cycles.   Worsening over the past 3 months.     History of migraines since teen years.  Now with monthly menstrual migraines.    Nausea and blurry vision prior to onset of migraines. +photosensitivity.        Description:   Duration of bleeding episodes: before 3 days now a week   Frequency between periods:  Once a month  Describe bleeding/flow:  consistent doesn't taper off   Clots: not any large ones    Number of pads/hour: 1 heavy pad every 1-2 hours  Cramping: severe and before    Accompanying Signs & Symptoms:  Weakness: no  - just fatigued   Lightheadedness: no   Hot flashes: no   Nosebleeds/Easy bruising: no   Vaginal Discharge: no     History:  Patient's last menstrual period was 2019.  Previous normal periods: YES  Contraceptive use: NO and abstinence  Possibility of Pregnancy: no   Any bleeding after intercourse: no   Age of first period (menarche): 14  Abnormal PAP Smears: no     Precipitating factors:   nothing    Alleviating factors:  nothing    Therapies Tried and outcome: nothing     Mother with partial hysterectomy in her 40's.        Is not currently sexually active.     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  Will be home until 20.       Patient Active Problem List   Diagnosis     CARDIOVASCULAR SCREENING; LDL GOAL LESS THAN 160     Appendicitis, acute     Menorrhagia with regular cycle     Migraine with aura and without status migrainosus, not intractable     Past Surgical History:   Procedure Laterality Date     granulomatous mastitis Right      HC EXCISION BREAST LESION, OPEN >=1  2009    Rt. Breast/  Fibroadenoma     HC REMOVE  "TONSILS/ADENOIDS,12+ Y/O  2002    T & A 12+y.o.     INCISION AND DRAINAGE BREAST Right 5/28/2019    Procedure: INCISION AND DRAINAGE, BREAST RIGHT;  Surgeon: Kerri Carrington MD;  Location: [facility]     LAPAROSCOPIC APPENDECTOMY  3/14/2012    Procedure:LAPAROSCOPIC APPENDECTOMY; Laparoscopic Appendectomy; Surgeon:ANTHONY MORAN; Location:[facility] OR     SURGICAL HISTORY OF -   2006    Breast Augmentation       Social History     Tobacco Use     Smoking status: Never Smoker     Smokeless tobacco: Never Used   Substance Use Topics     Alcohol use: Yes     Alcohol/week: 0.0 standard drinks     Comment: rarely      Family History   Problem Relation Age of Onset     Neurologic Disorder Father         parkinsons     Neurologic Disorder Paternal Grandmother         parkinsons     Depression Sister      Anxiety Disorder Sister          No current outpatient medications on file.     Allergies   Allergen Reactions     Dairy [Milk Products]        Reviewed and updated as needed this visit by Provider         Review of Systems   ROS COMP: Constitutional, HEENT, cardiovascular, pulmonary, gi and gu systems are negative, except as otherwise noted.      Objective    /78 (BP Location: Right arm, Patient Position: Sitting, Cuff Size: Adult Regular)   Pulse 67   Temp 98  F (36.7  C) (Oral)   Ht 1.626 m (5' 4\")   Wt 58.1 kg (128 lb)   LMP 11/29/2019   SpO2 100%   BMI 21.97 kg/m    Body mass index is 21.97 kg/m .  Physical Exam     GENERAL: healthy, alert and no distress  ABDOMEN: soft, nontender, no hepatosplenomegaly, no masses and bowel sounds normal   (female): normal female external genitalia, normal urethral meatus, vaginal mucosa pink, moist, well rugated, and normal cervix/adnexa/uterus without masses or discharge, +retroverted uterus  NEURO: Normal strength and tone, mentation intact and speech normal  PSYCH: mentation appears normal, affect normal/bright            Assessment & Plan     Kianna was seen today for " amenorrhea.    Diagnoses and all orders for this visit:    Menorrhagia with regular cycle  Patient education completed regarding need for pelvic ultrasound and labs for further evaluation.    Discussed oral contraceptives (not a candidate for JONELLE's due to history of migraine with aura) and discussed Mirena IUD as a treatment option for menorrhagia.    -     US Pelvic Complete w Transvaginal; Future - will notify of pelvic ultrasound results once available  -     **CBC with platelets FUTURE 1yr; Future  -     Ferritin; Future  -     Iron and iron binding capacity; Future    Migraine with aura and without status migrainosus, not intractable  Menstrual migraines once monthly.  Recommended against estrogen containing contraceptives.      Screening for cervical cancer  -     Pap imaged thin layer screen with HPV - recommended age 30 - 65 years (select HPV order below)  -     HPV High Risk Types DNA Cervical        Return in about 2 weeks (around 12/31/2019) for follow-up ultrasound.    BRYON Cantu [facility]

## 2019-12-18 ENCOUNTER — HOSPITAL ENCOUNTER (OUTPATIENT)
Dept: ULTRASOUND IMAGING | Facility: CLINIC | Age: 34
Discharge: HOME | End: 2019-12-18
Attending: NURSE PRACTITIONER | Admitting: NURSE PRACTITIONER
Payer: COMMERCIAL

## 2019-12-18 ENCOUNTER — HOSPITAL ENCOUNTER (OUTPATIENT)
Dept: MAMMOGRAPHY | Facility: CLINIC | Age: 34
Discharge: HOME | End: 2019-12-18
Attending: SURGERY | Admitting: SURGERY
Payer: COMMERCIAL

## 2019-12-18 ENCOUNTER — OFFICE VISIT (OUTPATIENT)
Dept: SURGERY | Facility: CLINIC | Age: 34
End: 2019-12-18
Payer: COMMERCIAL

## 2019-12-18 VITALS
SYSTOLIC BLOOD PRESSURE: 112 MMHG | BODY MASS INDEX: 21.85 KG/M2 | DIASTOLIC BLOOD PRESSURE: 70 MMHG | WEIGHT: 128 LBS | HEIGHT: 64 IN | HEART RATE: 61 BPM

## 2019-12-18 DIAGNOSIS — N92.0 MENORRHAGIA WITH REGULAR CYCLE: ICD-10-CM

## 2019-12-18 DIAGNOSIS — N61.20 GRANULOMATOUS MASTITIS: ICD-10-CM

## 2019-12-18 PROCEDURE — 76642 ULTRASOUND BREAST LIMITED: CPT | Mod: RT

## 2019-12-18 PROCEDURE — 99214 OFFICE O/P EST MOD 30 MIN: CPT | Performed by: SURGERY

## 2019-12-18 PROCEDURE — 76830 TRANSVAGINAL US NON-OB: CPT

## 2019-12-18 ASSESSMENT — MIFFLIN-ST. JEOR: SCORE: 1270.6

## 2019-12-18 NOTE — LETTER
"          [facility]    [facility] [facility], [address]  [place], [place] [address]  Phone [phone]  Fax [phone] [address], [address]  [facility] [place] [address]  Phone [phone]  Fax [phone]    [email or web address]     2019    Re: Kianna Gore  : 1985      Breast care follow-up note     Kianna Gore presents today for breast care follow-up exam.  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  She had minimal improvement with 2 courses of prednisone and was eventually treated with methotrexate.  She has had a good response to that but has been off it for 2 weeks.  She feels that there is some nodularity in the right breast but has not noticed any evidence of inflammation or skin changes..       Physical Exam:  /70   Pulse 61   Ht 1.626 m (5' 4\")   Wt 58.1 kg (128 lb)   LMP 2019   BMI 21.97 kg/m    Patient appears healthy and alert.  Pleasant affect  No cervical lymphadenopathy or thyroid fullness.  Breathing comfortably, lung fields clear  Bilateral breast exam performed.  No skin changes or nipple discharge.  No new nipple inversion.  No new discrete lumps or bumps.  No suggestion of axillary lymphadenopathy on either side.  Scars well-healed on the right breast.  Subpectoral implants in place bilaterally.  No axillary lymphadenopathy.     Recent imaging studies were personally reviewed by me and pertinent positives are noted.  Right breast ultrasound showed small nodularity which is improved from previous imaging.     Assessment and plan: Kianna Gore returns for breast care follow-up.  She appears to be significantly improved from her previous diagnosis of granulomatous mastitis.  [place]  She has been off her methotrexate for 2 weeks and is hoping to stay off of it, although she tolerated it well.  If she has any evidence of " increased inflammation, I have recommended that she resume her methotrexate for 6 to 8 weeks.  We would be happy to see her again this summer when she returns from [place].  Thank you for the opportunity to help in her care.     Jamie Sarmiento M.D.  [facility]  [phone]

## 2019-12-18 NOTE — PROGRESS NOTES
"Breast care follow-up note    Kianna Gore presents today for breast care follow-up exam.  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  She had minimal improvement with 2 courses of prednisone and was eventually treated with methotrexate.  She has had a good response to that but has been off it for 2 weeks.  She feels that there is some nodularity in the right breast but has not noticed any evidence of inflammation or skin changes..      Physical Exam:  /70   Pulse 61   Ht 1.626 m (5' 4\")   Wt 58.1 kg (128 lb)   LMP 11/29/2019   BMI 21.97 kg/m    Patient appears healthy and alert.  Pleasant affect  No cervical lymphadenopathy or thyroid fullness.  Breathing comfortably, lung fields clear  Bilateral breast exam performed.  No skin changes or nipple discharge.  No new nipple inversion.  No new discrete lumps or bumps.  No suggestion of axillary lymphadenopathy on either side.  Scars well-healed on the right breast.  Subpectoral implants in place bilaterally.  No axillary lymphadenopathy.    Recent imaging studies were personally reviewed by me and pertinent positives are noted.  Right breast ultrasound showed small nodularity which is improved from previous imaging.    Assessment and plan: Kianna Gore returns for breast care follow-up.  She appears to be significantly improved from her previous diagnosis of granulomatous mastitis.  [place]  She has been off her methotrexate for 2 weeks and is hoping to stay off of it, although she tolerated it well.  If she has any evidence of increased inflammation, I have recommended that she resume her methotrexate for 6 to 8 weeks.  We would be happy to see her again this summer when she returns from [place].  Thank you for the opportunity to help in her care.    Jamie Sarmiento M.D.  [facility]  [phone]    Please route or send letter to:  Primary Care Provider (PCP) and Referring " Provider

## 2019-12-19 DIAGNOSIS — R93.89 ABNORMAL PELVIC ULTRASOUND: Primary | ICD-10-CM

## 2019-12-19 LAB
COPATH REPORT: NORMAL
PAP: NORMAL

## 2019-12-23 ENCOUNTER — PREP FOR PROCEDURE (OUTPATIENT)
Dept: OBGYN | Facility: CLINIC | Age: 34
End: 2019-12-23

## 2019-12-23 ENCOUNTER — TELEPHONE (OUTPATIENT)
Dept: OBGYN | Facility: CLINIC | Age: 34
End: 2019-12-23

## 2019-12-23 ENCOUNTER — OFFICE VISIT (OUTPATIENT)
Dept: OBGYN | Facility: CLINIC | Age: 34
End: 2019-12-23
Payer: COMMERCIAL

## 2019-12-23 VITALS
WEIGHT: 131 LBS | SYSTOLIC BLOOD PRESSURE: 110 MMHG | HEIGHT: 64 IN | BODY MASS INDEX: 22.36 KG/M2 | DIASTOLIC BLOOD PRESSURE: 80 MMHG

## 2019-12-23 DIAGNOSIS — N92.0 MENORRHAGIA WITH REGULAR CYCLE: Primary | ICD-10-CM

## 2019-12-23 DIAGNOSIS — N84.0 ENDOMETRIAL POLYP: ICD-10-CM

## 2019-12-23 DIAGNOSIS — N92.0 MENORRHAGIA: Primary | ICD-10-CM

## 2019-12-23 LAB
FINAL DIAGNOSIS: NORMAL
HPV HR 12 DNA CVX QL NAA+PROBE: NEGATIVE
HPV16 DNA SPEC QL NAA+PROBE: NEGATIVE
HPV18 DNA SPEC QL NAA+PROBE: NEGATIVE
SPECIMEN DESCRIPTION: NORMAL
SPECIMEN SOURCE CVX/VAG CYTO: NORMAL

## 2019-12-23 PROCEDURE — 99203 OFFICE O/P NEW LOW 30 MIN: CPT | Performed by: OBSTETRICS & GYNECOLOGY

## 2019-12-23 ASSESSMENT — MIFFLIN-ST. JEOR: SCORE: 1284.21

## 2019-12-23 NOTE — PROGRESS NOTES
OB/Gyn Consultation:    Kianna Gore was sent to me for consultation by Elizabeth Buagh APRN CNP for evaluation of dysmenorrhea, menorrhagia and abnormal ultrasound finding.      SUBJECTIVE:                                                   CC:  Patient presents with:  Abnormal Bleeding Problem: Heavy and cramping      HPI:  Kianna Gore is a 33 year old  who presents with AUB.    Her cramping and bleeding is severe.  Cramping with cycles is sometimes so bad she can't get out of bed or leave the house.  Bleeding so severely that she goes through 1 pad/hr.  It has been going on for years, but worse in the last several months.  Was on ortho evra patch years ago, no recent hormonal manipulation.  Condoms for birth control.  No fam h/o gyn cancers, but her mom had a hyst in her 40s for heavy bleeding, and mat aunt had hyst due to polyps.  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  No history of bleeding disorder, abnl pap, anticoagulation use, thyroid disorder.    Per  PCP Note:  Vaginal Bleeding (Dysmenorrhea)  Onset: 2 years- progressively worse   Menstrual cycles are heavy and lasting a full week.   +Cramping and fatigue.  +Migraines with menstrual cycles.   Worsening over the past 3 months.      History of migraines since teen years.  Now with monthly menstrual migraines.    Nausea and blurry vision prior to onset of migraines. +photosensitivity.         Description:   Duration of bleeding episodes: before 3 days now a week   Frequency between periods:  Once a month  Describe bleeding/flow:  consistent doesn't taper off   Clots: not any large ones    Number of pads/hour: 1 heavy pad every 1-2 hours  Cramping: severe and before    Accompanying Signs & Symptoms:  Weakness: no  - just fatigued   Lightheadedness: no   Hot flashes: no   Nosebleeds/Easy bruising: no   Vaginal Discharge: no     History:  Patient's last menstrual period was 2019.  Previous  "normal periods: YES  Contraceptive use: NO and abstinence  Possibility of Pregnancy: no   Any bleeding after intercourse: no   Age of first period (menarche): 14  Abnormal PAP Smears: no     Precipitating factors:   nothing    Alleviating factors:  nothing     Therapies Tried and outcome: nothing      Mother with partial hysterectomy in her 40's.         Is not currently sexually active.     ROS: 10 point ROS negative other than as listed above in HPI.    Gyn History:  Patient's last menstrual period was 2019 (exact date).     Patient is sexually active.  Using condoms for contraception.   Recent pap smears:    Lab Results   Component Value Date    PAP NIL 2019    PAP NIL 2014    PAP NIL 2007       PMH, PSH, Soc Hx, Fam Hx, Meds, and allergies reviewed in Epic.  Denies family history of gyn cancer    OBJECTIVE:     /80 (BP Location: Right arm, Patient Position: Chair, Cuff Size: Adult Regular)   Ht 1.626 m (5' 4\")   Wt 59.4 kg (131 lb)   LMP 2019 (Exact Date)   Breastfeeding No   BMI 22.49 kg/m      Gen: Healthy appearing thin female, no acute distress, comfortable, well groomed  HENT: No scleral injection or icterus  CV: Regular rate  Resp: Normal work of breathing, no cough  GI: Abdomen soft, non-tender. No masses, organomegaly  Skin: No suspicious lesions or rashes  Psychiatric: mentation appears normal and affect bright  : deferred to OR    Test Results:  PELVIC ULTRASOUND WITH ENDOVAGINAL TRANSDUCER    2019 4:10 PM      HISTORY: Menorrhagia with regular cycle.     TECHNIQUE: Endovaginal sonography was added to the transabdominal  exam.     COMPARISON: CT abdomen and pelvis on 3/14/2012.     FINDINGS:   Endometrium: Endometrium appears mildly tortuous and thickened  predominantly at the fundal portion measuring 18 mm in thickness with  evidence of internal flow.     Uterus: Measures 9.0 x 5.6 x 3.9 cm. No uterine fibroids. There are  cervical nabothian " cysts.     Right ovary: Measures 3.2 x 2.5 x 2.1 cm. It demonstrates normal  follicular structure and color-flow.     Left ovary: Measures 2.9 x 1.9 x 1.2 cm. It demonstrates normal  follicular structure and color-flow.     Additional findings: None.                                                                   IMPRESSION: The endometrial stripe appears mildly heterogenous and  thickened predominantly at the fundal portion measuring 1.8 cm with  evidence of internal flow, finding could represent endometrial  hyperplasia versus endometrial polyp.    ASSESSMENT/PLAN:                                                      1. Menorrhagia with regular cycle  Discussed etiologies for abnormal uterine bleeding; including polyps, fibroids, adenomyosis, endometrial cancer or pre-cancer, hormonal imbalances related to menopause, coagulopathy, or medication side effect. Her most likely cause is the abnormal area inside the uterus which is potentially an endometrial polyp.   Discussed options for treatment including removal of polyps, hysteroscopy D&C to rule out precancer/cancer, and concomitant placement of IUD to suppress menstrual flow.  She is not currently planning pregnancy and desires to do this procedure prior to [place].  Recommend hysteroscopy, dilation and curettage, placement of Mirena intrauterine device.  Discussed risks of surgery including bleeding to the point of requiring blood transfusion, infection, injury to surrounding organs, possibility of needing a larger incision, blood clot, and pneumonia.  Discussed recovery expectations and that this is typically a same-day surgery.  Pt has had all questions answered and has agreed to proceed.  Will meet with PCP for pre-op physical with PCP.  Will sign consent form day of the procedure.  Pre-op orders entered.   - [facility]-Operative Worksheet GYN     Leeann Horvath MD, MPH  Obstetrics and Gynecology      Total time spent was  25 minutes; greater than 50% of time was spent in counseling and/or coordination of care for the above listed diagnoses, not including time spent on the procedure.

## 2019-12-23 NOTE — TELEPHONE ENCOUNTER
Type of surgery: hysteroscopy, dilation and curettage, intrauterine device placement  Location of surgery: [facility]  Date and time of surgery: 12/31/2019 @ 11:35  Surgeon: Dr. Leeann Ronquillo   Pre-Op Appt Date: @ OV  Post-Op Appt Date: n/a   Packet sent out: given in clinic 12/23  Pre-cert/Authorization completed:  Not Applicable  Date: 12/23

## 2019-12-23 NOTE — NURSING NOTE
"Chief Complaint   Patient presents with     Abnormal Bleeding Problem     Heavy and cramping       Initial /80 (BP Location: Right arm, Patient Position: Chair, Cuff Size: Adult Regular)   Ht 1.626 m (5' 4\")   Wt 59.4 kg (131 lb)   LMP 2019 (Exact Date)   Breastfeeding No   BMI 22.49 kg/m   Estimated body mass index is 22.49 kg/m  as calculated from the following:    Height as of this encounter: 1.626 m (5' 4\").    Weight as of this encounter: 59.4 kg (131 lb).  BP completed using cuff size: regular    Questioned patient about current smoking habits.  Pt. has never smoked.          The following HM Due: NONE    Melvi Person CMA    "

## 2019-12-23 NOTE — TELEPHONE ENCOUNTER
Question Answer Comment   Procedure name(s) - multi select hysteroscopy, dilation and curettage, intrauterine device placement    Reason for procedure menorrhagia, endometrial polyp    Is this a multi surgeon case? No    Laterality N/A    Request for additional equipment Other (see comments) None   Anesthesia Local + MAC    Initiate Pre-op orders for above procedure: Yes, as ordered in Epic Additional orders noted there also   Location of Case: [facility]    Surgeon Procedure Time (incision to closure) in minutes (per procedure as applicable) 30    Note:  Surgical Case Time Needed (in minutes)   Patient Class (for admit prior to surgery, specify number of days in comments): Same day (surgery center outpatient)    H&P To Be Completed By: Already Done    Vendor Needed? No

## 2019-12-31 ENCOUNTER — ANESTHESIA (OUTPATIENT)
Dept: SURGERY | Facility: CLINIC | Age: 34
End: 2019-12-31
Payer: COMMERCIAL

## 2019-12-31 ENCOUNTER — HOSPITAL ENCOUNTER (OUTPATIENT)
Facility: CLINIC | Age: 34
Discharge: HOME | End: 2019-12-31
Attending: OBSTETRICS & GYNECOLOGY | Admitting: OBSTETRICS & GYNECOLOGY
Payer: COMMERCIAL

## 2019-12-31 ENCOUNTER — ANESTHESIA EVENT (OUTPATIENT)
Dept: SURGERY | Facility: CLINIC | Age: 34
End: 2019-12-31
Payer: COMMERCIAL

## 2019-12-31 VITALS
WEIGHT: 130.1 LBS | HEART RATE: 56 BPM | RESPIRATION RATE: 20 BRPM | BODY MASS INDEX: 22.21 KG/M2 | TEMPERATURE: 98.4 F | DIASTOLIC BLOOD PRESSURE: 88 MMHG | OXYGEN SATURATION: 99 % | SYSTOLIC BLOOD PRESSURE: 124 MMHG | HEIGHT: 64 IN

## 2019-12-31 DIAGNOSIS — N84.0 ENDOMETRIAL POLYP: ICD-10-CM

## 2019-12-31 DIAGNOSIS — N92.0 MENORRHAGIA WITH REGULAR CYCLE: Primary | ICD-10-CM

## 2019-12-31 DIAGNOSIS — N92.0 MENORRHAGIA: ICD-10-CM

## 2019-12-31 DIAGNOSIS — Z98.890 S/P D&C (STATUS POST DILATION AND CURETTAGE): ICD-10-CM

## 2019-12-31 LAB — HCG UR QL: NEGATIVE

## 2019-12-31 PROCEDURE — 58558 HYSTEROSCOPY BIOPSY: CPT | Performed by: OBSTETRICS & GYNECOLOGY

## 2019-12-31 PROCEDURE — 40000306 ZZH STATISTIC PRE PROC ASSESS II: Performed by: OBSTETRICS & GYNECOLOGY

## 2019-12-31 PROCEDURE — 36000058 ZZH SURGERY LEVEL 3 EA 15 ADDTL MIN: Performed by: OBSTETRICS & GYNECOLOGY

## 2019-12-31 PROCEDURE — 27210794 ZZH OR GENERAL SUPPLY STERILE: Performed by: OBSTETRICS & GYNECOLOGY

## 2019-12-31 PROCEDURE — 58300 INSERT INTRAUTERINE DEVICE: CPT | Performed by: OBSTETRICS & GYNECOLOGY

## 2019-12-31 PROCEDURE — 36000056 ZZH SURGERY LEVEL 3 1ST 30 MIN: Performed by: OBSTETRICS & GYNECOLOGY

## 2019-12-31 PROCEDURE — 25000128 H RX IP 250 OP 636: Performed by: NURSE ANESTHETIST, CERTIFIED REGISTERED

## 2019-12-31 PROCEDURE — 25800025 ZZH RX 258: Performed by: OBSTETRICS & GYNECOLOGY

## 2019-12-31 PROCEDURE — 25000125 ZZHC RX 250: Performed by: ANESTHESIOLOGY

## 2019-12-31 PROCEDURE — 25800030 ZZH RX IP 258 OP 636: Performed by: ANESTHESIOLOGY

## 2019-12-31 PROCEDURE — 25000125 ZZHC RX 250: Performed by: OBSTETRICS & GYNECOLOGY

## 2019-12-31 PROCEDURE — 25000128 H RX IP 250 OP 636: Performed by: ANESTHESIOLOGY

## 2019-12-31 PROCEDURE — 81025 URINE PREGNANCY TEST: CPT | Performed by: OBSTETRICS & GYNECOLOGY

## 2019-12-31 PROCEDURE — 25000125 ZZHC RX 250: Performed by: NURSE ANESTHETIST, CERTIFIED REGISTERED

## 2019-12-31 PROCEDURE — 37000008 ZZH ANESTHESIA TECHNICAL FEE, 1ST 30 MIN: Performed by: OBSTETRICS & GYNECOLOGY

## 2019-12-31 PROCEDURE — 71000012 ZZH RECOVERY PHASE 1 LEVEL 1 FIRST HR: Performed by: OBSTETRICS & GYNECOLOGY

## 2019-12-31 PROCEDURE — 88305 TISSUE EXAM BY PATHOLOGIST: CPT | Mod: 26 | Performed by: OBSTETRICS & GYNECOLOGY

## 2019-12-31 PROCEDURE — 71000027 ZZH RECOVERY PHASE 2 EACH 15 MINS: Performed by: OBSTETRICS & GYNECOLOGY

## 2019-12-31 PROCEDURE — 37000009 ZZH ANESTHESIA TECHNICAL FEE, EACH ADDTL 15 MIN: Performed by: OBSTETRICS & GYNECOLOGY

## 2019-12-31 PROCEDURE — 88305 TISSUE EXAM BY PATHOLOGIST: CPT | Performed by: OBSTETRICS & GYNECOLOGY

## 2019-12-31 PROCEDURE — 71000013 ZZH RECOVERY PHASE 1 LEVEL 1 EA ADDTL HR: Performed by: OBSTETRICS & GYNECOLOGY

## 2019-12-31 DEVICE — IUD CONTRACEPTIVE DEVICE MIRENA 50419-4230-01: Type: IMPLANTABLE DEVICE | Site: UTERUS | Status: FUNCTIONAL

## 2019-12-31 RX ORDER — DEXAMETHASONE SODIUM PHOSPHATE 4 MG/ML
INJECTION, SOLUTION INTRA-ARTICULAR; INTRALESIONAL; INTRAMUSCULAR; INTRAVENOUS; SOFT TISSUE PRN
Status: DISCONTINUED | OUTPATIENT
Start: 2019-12-31 | End: 2019-12-31

## 2019-12-31 RX ORDER — SODIUM CHLORIDE, SODIUM LACTATE, POTASSIUM CHLORIDE, CALCIUM CHLORIDE 600; 310; 30; 20 MG/100ML; MG/100ML; MG/100ML; MG/100ML
INJECTION, SOLUTION INTRAVENOUS CONTINUOUS
Status: DISCONTINUED | OUTPATIENT
Start: 2019-12-31 | End: 2019-12-31 | Stop reason: HOSPADM

## 2019-12-31 RX ORDER — KETOROLAC TROMETHAMINE 30 MG/ML
INJECTION, SOLUTION INTRAMUSCULAR; INTRAVENOUS PRN
Status: DISCONTINUED | OUTPATIENT
Start: 2019-12-31 | End: 2019-12-31

## 2019-12-31 RX ORDER — GLYCOPYRROLATE 0.2 MG/ML
INJECTION, SOLUTION INTRAMUSCULAR; INTRAVENOUS PRN
Status: DISCONTINUED | OUTPATIENT
Start: 2019-12-31 | End: 2019-12-31

## 2019-12-31 RX ORDER — HYDROCODONE BITARTRATE AND ACETAMINOPHEN 5; 325 MG/1; MG/1
1 TABLET ORAL
Status: DISCONTINUED | OUTPATIENT
Start: 2019-12-31 | End: 2019-12-31 | Stop reason: HOSPADM

## 2019-12-31 RX ORDER — ONDANSETRON 2 MG/ML
INJECTION INTRAMUSCULAR; INTRAVENOUS PRN
Status: DISCONTINUED | OUTPATIENT
Start: 2019-12-31 | End: 2019-12-31

## 2019-12-31 RX ORDER — SODIUM CHLORIDE, SODIUM LACTATE, POTASSIUM CHLORIDE, CALCIUM CHLORIDE 600; 310; 30; 20 MG/100ML; MG/100ML; MG/100ML; MG/100ML
INJECTION, SOLUTION INTRAVENOUS CONTINUOUS
Status: CANCELLED | OUTPATIENT
Start: 2019-12-31

## 2019-12-31 RX ORDER — FENTANYL CITRATE 50 UG/ML
25-50 INJECTION, SOLUTION INTRAMUSCULAR; INTRAVENOUS
Status: DISCONTINUED | OUTPATIENT
Start: 2019-12-31 | End: 2019-12-31 | Stop reason: HOSPADM

## 2019-12-31 RX ORDER — PROPOFOL 10 MG/ML
INJECTION, EMULSION INTRAVENOUS CONTINUOUS PRN
Status: DISCONTINUED | OUTPATIENT
Start: 2019-12-31 | End: 2019-12-31

## 2019-12-31 RX ORDER — MEPERIDINE HYDROCHLORIDE 25 MG/ML
12.5 INJECTION INTRAMUSCULAR; INTRAVENOUS; SUBCUTANEOUS
Status: CANCELLED | OUTPATIENT
Start: 2019-12-31

## 2019-12-31 RX ORDER — LIDOCAINE 40 MG/G
CREAM TOPICAL
Status: DISCONTINUED | OUTPATIENT
Start: 2019-12-31 | End: 2019-12-31 | Stop reason: HOSPADM

## 2019-12-31 RX ORDER — ONDANSETRON 4 MG/1
4 TABLET, ORALLY DISINTEGRATING ORAL EVERY 30 MIN PRN
Status: CANCELLED | OUTPATIENT
Start: 2019-12-31

## 2019-12-31 RX ORDER — ONDANSETRON 2 MG/ML
4 INJECTION INTRAMUSCULAR; INTRAVENOUS EVERY 30 MIN PRN
Status: CANCELLED | OUTPATIENT
Start: 2019-12-31

## 2019-12-31 RX ORDER — PROPOFOL 10 MG/ML
INJECTION, EMULSION INTRAVENOUS PRN
Status: DISCONTINUED | OUTPATIENT
Start: 2019-12-31 | End: 2019-12-31

## 2019-12-31 RX ORDER — NALOXONE HYDROCHLORIDE 0.4 MG/ML
.1-.4 INJECTION, SOLUTION INTRAMUSCULAR; INTRAVENOUS; SUBCUTANEOUS
Status: CANCELLED | OUTPATIENT
Start: 2019-12-31 | End: 2020-01-01

## 2019-12-31 RX ORDER — FENTANYL CITRATE 50 UG/ML
INJECTION, SOLUTION INTRAMUSCULAR; INTRAVENOUS PRN
Status: DISCONTINUED | OUTPATIENT
Start: 2019-12-31 | End: 2019-12-31

## 2019-12-31 RX ADMIN — LIDOCAINE HYDROCHLORIDE 50 MG: 10 INJECTION, SOLUTION EPIDURAL; INFILTRATION; INTRACAUDAL; PERINEURAL at 11:38

## 2019-12-31 RX ADMIN — FENTANYL CITRATE 50 MCG: 50 INJECTION, SOLUTION INTRAMUSCULAR; INTRAVENOUS at 12:48

## 2019-12-31 RX ADMIN — MIDAZOLAM 2 MG: 1 INJECTION INTRAMUSCULAR; INTRAVENOUS at 11:33

## 2019-12-31 RX ADMIN — FENTANYL CITRATE 100 MCG: 50 INJECTION, SOLUTION INTRAMUSCULAR; INTRAVENOUS at 11:38

## 2019-12-31 RX ADMIN — ONDANSETRON HYDROCHLORIDE 4 MG: 2 INJECTION, SOLUTION INTRAVENOUS at 12:05

## 2019-12-31 RX ADMIN — DEXAMETHASONE SODIUM PHOSPHATE 4 MG: 4 INJECTION, SOLUTION INTRA-ARTICULAR; INTRALESIONAL; INTRAMUSCULAR; INTRAVENOUS; SOFT TISSUE at 11:38

## 2019-12-31 RX ADMIN — SODIUM CHLORIDE, POTASSIUM CHLORIDE, SODIUM LACTATE AND CALCIUM CHLORIDE: 600; 310; 30; 20 INJECTION, SOLUTION INTRAVENOUS at 10:45

## 2019-12-31 RX ADMIN — PROPOFOL 150 MG: 10 INJECTION, EMULSION INTRAVENOUS at 11:38

## 2019-12-31 RX ADMIN — PROPOFOL 75 MCG/KG/MIN: 10 INJECTION, EMULSION INTRAVENOUS at 11:42

## 2019-12-31 RX ADMIN — GLYCOPYRROLATE 0.2 MG: 0.2 INJECTION, SOLUTION INTRAMUSCULAR; INTRAVENOUS at 11:38

## 2019-12-31 RX ADMIN — FENTANYL CITRATE 50 MCG: 50 INJECTION, SOLUTION INTRAMUSCULAR; INTRAVENOUS at 13:09

## 2019-12-31 RX ADMIN — KETOROLAC TROMETHAMINE 30 MG: 30 INJECTION, SOLUTION INTRAMUSCULAR at 12:08

## 2019-12-31 ASSESSMENT — MIFFLIN-ST. JEOR: SCORE: 1275.13

## 2019-12-31 NOTE — ANESTHESIA PREPROCEDURE EVALUATION
Anesthesia Pre-Procedure Evaluation    Patient: Kianna Gore   MRN: [id number] : 1985          Preoperative Diagnosis: Menorrhagia [N92.0]  Endometrial polyp [N84.0]    Procedure(s):  Hysteroscopy, dilation and curettage using Myosure  Intrauterine device placement    Past Medical History:   Diagnosis Date     Granulomatous mastitis     right sided     PONV (postoperative nausea and vomiting)      Past Surgical History:   Procedure Laterality Date     granulomatous mastitis Right      HC EXCISION BREAST LESION, OPEN >=1  2009    Rt. Breast/  Fibroadenoma     HC REMOVE TONSILS/ADENOIDS,12+ Y/O      T & A 12+y.o.     INCISION AND DRAINAGE BREAST Right 2019    Procedure: INCISION AND DRAINAGE, BREAST RIGHT;  Surgeon: Kerri Carrington MD;  Location: [facility]     LAPAROSCOPIC APPENDECTOMY  3/14/2012    Procedure:LAPAROSCOPIC APPENDECTOMY; Laparoscopic Appendectomy; Surgeon:ANTHONY MORAN; Location:[facility]     SURGICAL HISTORY OF -       Breast Augmentation     Anesthesia Evaluation     .             ROS/MED HX    ENT/Pulmonary:      (-) sleep apnea   Neurologic:     (+)migraines,     Cardiovascular:  - neg cardiovascular ROS       METS/Exercise Tolerance:     Hematologic:  - neg hematologic  ROS       Musculoskeletal:  - neg musculoskeletal ROS       GI/Hepatic:  - neg GI/hepatic ROS       Renal/Genitourinary:  - ROS Renal section negative       Endo:  - neg endo ROS       Psychiatric:        (-) psychiatric history   Infectious Disease:  - neg infectious disease ROS       Malignancy:      - no malignancy   Other:    - neg other ROS                      Physical Exam      Airway   Mallampati: II  TM distance: >3 FB  Neck ROM: full    Dental     Cardiovascular   Rhythm and rate: regular and normal  (-) no murmur    Pulmonary    breath sounds clear to auscultation    Other findings: Lab Test        05/25/15     03/14/12                       1040          1215          WBC          5.7         "  5.9           HGB          12.5         11.5*         MCV          85           87            PLT          198          175            Lab Test        08/23/19     08/20/19     05/28/15                       1106          1235          1539          NA           140          140          140           POTASSIUM    4.0          3.6          4.0           CHLORIDE     106          107          107           CO2          28           31           24            BUN          18           8            6*            CR           0.78         0.64         0.93          ANIONGAP     6            2*           9             GABBY          9.3          9.2          9.4           GLC          90           100*         77                  Lab Results   Component Value Date    WBC 5.7 05/25/2015    HGB 12.5 05/25/2015    HCT 38.1 05/25/2015     05/25/2015     08/23/2019    POTASSIUM 4.0 08/23/2019    CHLORIDE 106 08/23/2019    CO2 28 08/23/2019    BUN 18 08/23/2019    CR 0.78 08/23/2019    GLC 90 08/23/2019    GABBY 9.3 08/23/2019    ALBUMIN 3.7 08/23/2019    PROTTOTAL 7.6 08/23/2019    ALT 51 (H) 08/23/2019    AST 33 08/23/2019    ALKPHOS 77 08/23/2019    BILITOTAL 0.3 08/23/2019    TSH 0.61 05/28/2015    HCG Negative 05/28/2019    HCGS Negative 05/25/2015       Preop Vitals  BP Readings from Last 3 Encounters:   12/23/19 110/80   12/18/19 112/70   12/17/19 110/78    Pulse Readings from Last 3 Encounters:   12/18/19 61   12/17/19 67   08/20/19 65      Resp Readings from Last 3 Encounters:   07/26/19 16   07/12/19 16   05/28/19 16    SpO2 Readings from Last 3 Encounters:   12/17/19 100%   08/20/19 95%   07/26/19 100%      Temp Readings from Last 1 Encounters:   12/17/19 98  F (36.7  C) (Oral)    Ht Readings from Last 1 Encounters:   12/23/19 1.626 m (5' 4\")      Wt Readings from Last 1 Encounters:   12/23/19 59.4 kg (131 lb)    Estimated body mass index is 22.49 kg/m  as calculated from the following:    Height as of " "12/23/19: 1.626 m (5' 4\").    Weight as of 12/23/19: 59.4 kg (131 lb).       Anesthesia Plan      History & Physical Review  History and physical reviewed and following examination; no interval change.    ASA Status:  1 .    NPO Status:  > 8 hours    Plan for General and LMA with Propofol induction. Maintenance will be Balanced.    PONV prophylaxis:  Ondansetron (or other 5HT-3) and Dexamethasone or Solumedrol  Propofol gtt + Sevo + 60% O2 please    Patient requests GA.      Postoperative Care  Postoperative pain management:  IV analgesics and Oral pain medications.      Consents  Anesthetic plan, risks, benefits and alternatives discussed with:  Patient..                 Rajan Woods MD                    .  "

## 2019-12-31 NOTE — DISCHARGE INSTRUCTIONS
D    GENERAL ANESTHESIA OR SEDATION ADULT DISCHARGE INSTRUCTIONS   SPECIAL PRECAUTIONS FOR 24 HOURS AFTER SURGERY    IT IS NOT UNUSUAL TO FEEL LIGHT-HEADED OR FAINT, UP TO 24 HOURS AFTER SURGERY OR WHILE TAKING PAIN MEDICATION.  IF YOU HAVE THESE SYMPTOMS; SIT FOR A FEW MINUTES BEFORE STANDING AND HAVE SOMEONE ASSIST YOU WHEN YOU GET UP TO WALK OR USE THE BATHROOM.    YOU SHOULD REST AND RELAX FOR THE NEXT 24 HOURS AND YOU MUST MAKE ARRANGEMENTS TO HAVE SOMEONE STAY WITH YOU FOR AT LEAST 24 HOURS AFTER YOUR DISCHARGE.  AVOID HAZARDOUS AND STRENUOUS ACTIVITIES.  DO NOT MAKE IMPORTANT DECISIONS FOR 24 HOURS.    DO NOT DRIVE ANY VEHICLE OR OPERATE MECHANICAL EQUIPMENT FOR 24 HOURS FOLLOWING THE END OF YOUR SURGERY.  EVEN THOUGH YOU MAY FEEL NORMAL, YOUR REACTIONS MAY BE AFFECTED BY THE MEDICATION YOU HAVE RECEIVED.    DO NOT DRINK ALCOHOLIC BEVERAGES FOR 24 HOURS FOLLOWING YOUR SURGERY.    DRINK CLEAR LIQUIDS (APPLE JUICE, GINGER ALE, 7-UP, BROTH, ETC.).  PROGRESS TO YOUR REGULAR DIET AS YOU FEEL ABLE.    YOU MAY HAVE A DRY MOUTH, A SORE THROAT, MUSCLES ACHES OR TROUBLE SLEEPING.  THESE SHOULD GO AWAY AFTER 24 HOURS.    CALL YOUR DOCTOR FOR ANY OF THE FOLLOWING:  SIGNS OF INFECTION (FEVER, GROWING TENDERNESS AT THE SURGERY SITE, A LARGE AMOUNT OF DRAINAGE OR BLEEDING, SEVERE PAIN, FOUL-SMELLING DRAINAGE, REDNESS OR SWELLING.    IT HAS BEEN OVER 8 TO 10 HOURS SINCE SURGERY AND YOU ARE STILL NOT ABLE TO URINATE (PASS WATER).      DILATION AND CURETTAGE  DISCHARGE INSTRUCTIONS    PLEASE RETURN TO THE CLINIC IN:  ____1 WEEK  ____2 WEEKS  ____4 WEEKS  ____6 WEEKS  MAKE THIS APPOINTMENT AFTER YOU GET HOME IF IT HAS NOT ALREADY BEEN SCHEDULED.    DO NOT DRIVE A CAR, DRINK ALCOHOL OR USE MACHINERY FOR THE NEXT 24 HOURS.  YOU SHOULD WAIT UNTIL YOU HAVE RECOVERED BEFORE MAKING ANY IMPORTANT DECISIONS.    PAIN AND DISCOMFORT  YOU MAY HAVE CRAMPS OR A LOW BACKACHE FOR 24 TO 48 HOURS.  TYLENOL (ACETAMINOPHEN) OR MOTRIN  (IBUPROFEN) MAY HELP, OR YOUR DOCTOR MAY GIVE YOU PAIN MEDICINE.  CALL YOUR DOCTOR IF PAIN CANNOT BE CONTROLLED.  YOU MAY FEEL DROWSY AND WEAK FOR A DAY OR TWO.    VAGINAL DISCHARGE  YOU MAY HAVE SOME BLEEDING OR DISCHARGE FOR UP TO TWO WEEKS.  DO NOT DOUCHE, USE TAMPONS OR HAVE SEX (INTERCOURSE) IN THE FIRST WEEK.  CALL YOUR DOCTOR IF YOU SOAK MORE THAN ONE MAXI PAD (SANITARY [place]APKIN) PER HOUR, OR IF YOU PASS LARGE BLOOD CLOTS.    OTHER SYMPTOMS  YOU MAY HAVE A LOW FEVER FOR THE FIRST TWO DAYS.  CALL YOUR DOCTOR IF YOUR FEVER GOES OVER 101 DEGREES FAHRENHEIT.    IF YOU HAVE NAUSEA (FEEL SICK TO YOUR STOMACH), STAY IN BED.  TRY DRINKING A SMALL AMOUNT 7-UP, TEA OR SOUP.    DIET AND ACTIVITY  EAT LIGHT MEALS AND DRINK PLENTY OF FLUIDS FOR THE FIRST 24 HOURS (OR LONGER, IF YOU HAVE NAUSEA).    YOU MAY BATHE, SHOWER AND CLIMB STAIRS.  MOST WOMEN CAN RETURN TO WORK AFTER 24 HOURS.  YOU MAY GO BACK TO YOUR OTHER ACTIVITIES AFTER YOUR PAIN GOES AWAY.          You received Toradol, an IV form of ibuprofen (Motrin) at 12:00 pm.  Do not take any ibuprofen products until 6:00 p.m.

## 2019-12-31 NOTE — OP NOTE
Operative Note    Patient: Kianna Gore  : 1985  MRN: [id number]    Date of Service: 2019    Pre-operative diagnosis:  Abnormal uterine bleeding  Endometrial polyp    Post-operative diagnosis:  Same     Procedure:   Hysteroscopy  Dilation and curettage with myosure device  Placement of Mirena IUD    Surgeon: Leeann Horvath MD    Anesthesia: General    EBL: minimal  Urine: minimal  Fluids: see anesthesia record    Specimens: endometrial curettings and polyp  Complications: none apparent    Findings: large polyp or fibroid encompassing half of the endometrial cavity emanating from the right mid endometrial lining.  Small retroverted uterus, sounded to 9cm.    Indications: Kianna Gore is a 34 year old female who experienced abnormal uterine bleeding for years, worse over the last several months.  An ultrasound showed a thickened endometrial stripe with internal flow consistent with endometrial polyp.  She was recommended for hysteroscopy and D&C with placement of IUD.  Discussed risks, benefits, and alternatives to the procedure including risk of infection, bleeding, damage to local organs, blood clots, uterine perforation. The patient's questions were answered, understanding confirmed, and the patient signed written informed consent.    Procedure: The patient was taken to the operating room where she underwent anesthesia and was prepped and draped in the usual sterile fashion in dorsal lithotomy position.  A time-out was performed. A speculum was inserted into the vagina and the cervix visualized. A single-toothed tenaculum was placed at 12 o'clock on the cervix. The cervix was dilated using sequential dilators up to 6 mm.  The hysteroscope was assembled and inserted through the cervix.  The cavity was visualized with findings as above.  The morcellator was then inserted through the operating port on the hysteroscope and the polyps were morcellated.  The tissue was sent for  pathology.  Before and after photos were taken.  The hysteroscope was removed. The Mirena IUD was inserted and deployed in the instructions according to the .  The strings were trimmed to 2cm.  The tenaculum was removed from the cervix and good hemostasis was noted.  The speculum was removed from the vagina.  The patient tolerated the procedure well and was extubated in the OR and taken to the recovery room in stable condition.  Instrument, needle, and sponge counts were correct x2.      Leeann Horvath MD, MPH  [facility]

## 2019-12-31 NOTE — ANESTHESIA POSTPROCEDURE EVALUATION
Patient: Kianna Gore    Procedure(s):  Hysteroscopy, dilation and curettage using Myosure  Intrauterine device placement    Diagnosis:Menorrhagia [N92.0]  Endometrial polyp [N84.0]  Diagnosis Additional Information: No value filed.    Anesthesia Type:  General, LMA    Note:  Anesthesia Post Evaluation    Patient location during evaluation: PACU  Patient participation: Able to fully participate in evaluation  Level of consciousness: awake  Pain management: adequate  Airway patency: patent  Cardiovascular status: acceptable  Respiratory status: acceptable  Hydration status: euvolemic  PONV: controlled     Anesthetic complications: None          Last vitals:  Vitals:    12/31/19 1225 12/31/19 1230 12/31/19 1235   BP: 113/80 111/75 111/74   Pulse: 57 51 52   Resp: 16 15 13   Temp:      SpO2: 100% 100% 100%         Electronically Signed By: Rajan Woods MD  December 31, 2019  12:40 PM

## 2020-01-02 LAB — COPATH REPORT: NORMAL

## 2020-01-09 ENCOUNTER — MYC MEDICAL ADVICE (OUTPATIENT)
Dept: OBGYN | Facility: CLINIC | Age: 35
End: 2020-01-09

## 2020-09-28 ENCOUNTER — OFFICE VISIT (OUTPATIENT)
Dept: FAMILY MEDICINE | Facility: CLINIC | Age: 35
End: 2020-09-28
Payer: COMMERCIAL

## 2020-09-28 VITALS
HEIGHT: 64 IN | BODY MASS INDEX: 23.22 KG/M2 | SYSTOLIC BLOOD PRESSURE: 108 MMHG | DIASTOLIC BLOOD PRESSURE: 74 MMHG | WEIGHT: 136 LBS | HEART RATE: 86 BPM | OXYGEN SATURATION: 100 % | TEMPERATURE: 99 F

## 2020-09-28 DIAGNOSIS — Z23 NEED FOR PROPHYLACTIC VACCINATION AND INOCULATION AGAINST INFLUENZA: Primary | ICD-10-CM

## 2020-09-28 DIAGNOSIS — J01.00 ACUTE NON-RECURRENT MAXILLARY SINUSITIS: ICD-10-CM

## 2020-09-28 DIAGNOSIS — J33.9 NASAL POLYP: ICD-10-CM

## 2020-09-28 PROCEDURE — 90686 IIV4 VACC NO PRSV 0.5 ML IM: CPT | Performed by: NURSE PRACTITIONER

## 2020-09-28 PROCEDURE — 90471 IMMUNIZATION ADMIN: CPT | Performed by: NURSE PRACTITIONER

## 2020-09-28 PROCEDURE — 99213 OFFICE O/P EST LOW 20 MIN: CPT | Mod: 25 | Performed by: NURSE PRACTITIONER

## 2020-09-28 RX ORDER — AMOXICILLIN 400 MG/5ML
875 POWDER, FOR SUSPENSION ORAL 2 TIMES DAILY
Qty: 218 ML | Refills: 0 | Status: SHIPPED | OUTPATIENT
Start: 2020-09-28 | End: 2020-10-08

## 2020-09-28 ASSESSMENT — MIFFLIN-ST. JEOR: SCORE: 1301.89

## 2020-09-28 NOTE — PROGRESS NOTES
"Subjective   Kianna Gore is a 34 year old female who presents to clinic today for the following health issues:    HPI   Skin Lesion  Onset/Duration: x 2 months   Description  Location:  Nose left nostril   Color:  Unsure   Border description: raised,  Bothersome lesion   Character: raised  Itching: no  Bleeding:  no  Intensity:  mild  Progression of Symptoms:  same  Accompanying signs and symptoms:   Bleeding: no  Scaling: no  Excessive sun exposure/tanning: no  Sunscreen used: no  History:           Any previous history of skin cancer: no  Any family history of melanoma: no  Previous episodes of similar lesion: YES- pt states she was seen for similar issue x 2 months ago, was given a steroid nasal spray   Precipitating or alleviating factors: NA  Therapies tried and outcome: steroid nasal spray, not effective     Reviewed and updated as needed this visit by provider:         Review of Systems   Constitutional, HEENT, cardiovascular, pulmonary, GI, , musculoskeletal, neuro, skin, endocrine and psych systems are negative, except as otherwise noted per HPI.      Objective   /74   Pulse 86   Temp 99  F (37.2  C) (Tympanic)   Ht 1.626 m (5' 4\")   Wt 61.7 kg (136 lb)   SpO2 100%   Breastfeeding No   BMI 23.34 kg/m   Body mass index is 23.34 kg/m .  Physical Exam   GENERAL: healthy, alert, well nourished, well hydrated, no distress  HENT: ear canals- normal; TMs- normal; Nose- left polyp that is visualized in the left nostril. Not obstructing.  Mouth- no ulcers, no lesions, sinus pressure to palpation frontal.  NECK: no tenderness, positive anterior cervical lymphadenopathy, no asymmetry, no masses, no stiffness; thyroid- normal to palpation  RESP: lungs clear to auscultation - no rales, no rhonchi, no wheezes  CV: regular rates and rhythm, normal S1 S2, no S3 or S4 and no murmur, no click or rub -          Assessment & Plan   Kianna was seen today for derm problem.    Diagnoses and all orders " for this visit:    Need for prophylactic vaccination and inoculation against influenza  -     INFLUENZA VACCINE IM > 6 MONTHS VALENT IIV4 [95647]  -     Vaccine Administration, Initial [42056]    Nasal polyp  -     amoxicillin (AMOXIL) 400 MG/5ML suspension; Take 10.9 mLs (875 mg) by mouth 2 times daily for 10 days    Acute non-recurrent maxillary sinusitis  -     amoxicillin (AMOXIL) 400 MG/5ML suspension; Take 10.9 mLs (875 mg) by mouth 2 times daily for 10 days    Drainage that tastes and smells bad to patient, lots of swelling in nasal mucosa. Treat today for polyp and sinusitis. If any symptoms persist would send to ENT.         See Patient Instructions    Return in about 6 months (around 3/28/2021) for Physical Exam.            ROCKY Kessler     [address] [address]  [email or web address]   Office: [phone]

## 2021-01-15 ENCOUNTER — HEALTH MAINTENANCE LETTER (OUTPATIENT)
Age: 36
End: 2021-01-15

## 2021-09-04 ENCOUNTER — HEALTH MAINTENANCE LETTER (OUTPATIENT)
Age: 36
End: 2021-09-04

## 2022-02-13 ENCOUNTER — HEALTH MAINTENANCE LETTER (OUTPATIENT)
Age: 37
End: 2022-02-13

## 2022-10-16 ENCOUNTER — HEALTH MAINTENANCE LETTER (OUTPATIENT)
Age: 37
End: 2022-10-16

## 2023-03-26 ENCOUNTER — HEALTH MAINTENANCE LETTER (OUTPATIENT)
Age: 38
End: 2023-03-26

## 2024-06-18 NOTE — ANESTHESIA CARE TRANSFER NOTE
Patient: Kianna Gore    Procedure(s):  Hysteroscopy, dilation and curettage using Myosure  Intrauterine device placement    Diagnosis: Menorrhagia [N92.0]  Endometrial polyp [N84.0]  Diagnosis Additional Information: No value filed.    Anesthesia Type:   General, LMA     Note:  Airway :Face Mask  Patient transferred to:PACU  Comments: Patient with spontaneous respirations and adequate tidal volumes. Patient awake and responsive. LMA removed in OR to 8 L face tent. To PACU ventilating well. VSS. Report given.Handoff Report: Identifed the Patient, Identified the Reponsible Provider, Reviewed the pertinent medical history, Discussed the surgical course, Reviewed Intra-OP anesthesia mangement and issues during anesthesia, Set expectations for post-procedure period and Allowed opportunity for questions and acknowledgement of understanding      Vitals: (Last set prior to Anesthesia Care Transfer)    CRNA VITALS  12/31/2019 1145 - 12/31/2019 1221      12/31/2019             Pulse:  86    SpO2:  (!) 88 %                Electronically Signed By: BRYON Corral CRNA  December 31, 2019  12:21 PM  
normal

## 2025-01-21 NOTE — NURSING NOTE
Kianna Gore's goals for this visit include:   Chief Complaint   Patient presents with     Follow Up     post right breast I&D on 5/28       She requests these members of her care team be copied on today's visit information:     PCP: Elizabeth Baugh    Referring Provider:  No referring provider defined for this encounter.    /72 (BP Location: Left arm, Patient Position: Sitting, Cuff Size: Adult Regular)   Pulse 73   Temp 98.4  F (36.9  C) (Oral)   Wt 54.8 kg (120 lb 12.8 oz)   LMP 05/07/2019   SpO2 99%   BMI 20.74 kg/m      Do you need any medication refills at today's visit? Marycruz Pollard LPN      
no

## (undated) DEVICE — DRSG STERI STRIP 1/4X3" R1541

## (undated) DEVICE — DECANTER VIAL 2006S

## (undated) DEVICE — LINEN HALF SHEET 5512

## (undated) DEVICE — PACK MINOR SBA15MIFSE

## (undated) DEVICE — SUCTION CANISTER MEDIVAC LINER 3000ML W/LID 65651-530

## (undated) DEVICE — ESU GROUND PAD UNIVERSAL W/O CORD

## (undated) DEVICE — SU VICRYL 3-0 SH 27" J316H

## (undated) DEVICE — GLOVE PROTEXIS W/NEU-THERA 6.5  2D73TE65

## (undated) DEVICE — SOL WATER IRRIG 1000ML BOTTLE 2F7114

## (undated) DEVICE — TUBING SYS AQUILEX BLUE INFLOW AQL-110 YLW OUTFLOW AQL-111

## (undated) DEVICE — GLOVE PROTEXIS MICRO 6.0  2D73PM60

## (undated) DEVICE — LINEN TOWEL PACK X5 5464

## (undated) DEVICE — DRAPE UNDER BUTTOCK 89415

## (undated) DEVICE — BASIN SET MINOR DISP

## (undated) DEVICE — ESU ELEC BLADE 2.75" COATED/INSULATED E1455

## (undated) DEVICE — PAD CHUX UNDERPAD 23X24" 7136

## (undated) DEVICE — SPECIMEN BAG BEMIS HI FLOW SUCTION WHITE SOCK 533810

## (undated) DEVICE — BAG CLEAR TRASH 1.3M 39X33" P4040C

## (undated) DEVICE — PAD CHUX UNDERPAD 30X36" P3036C

## (undated) DEVICE — PACK MINOR LITHOTOMY RIDGES

## (undated) DEVICE — SOL NACL 0.9% IRRIG 3000ML BAG 2B7477

## (undated) DEVICE — LINEN FULL SHEET 5511

## (undated) DEVICE — SEAL SET MYOSURE ROD LENS SCOPE SINGLE USE 40-902

## (undated) DEVICE — GLOVE PROTEXIS BLUE W/NEU-THERA 7.0  2D73EB70

## (undated) DEVICE — Device

## (undated) DEVICE — GLOVE PROTEXIS BLUE W/NEU-THERA 6.5  2D73EB65

## (undated) DEVICE — CATH INTERMITTENT CLEAN-CATH FEMALE 14FR 6" VINYL LF 420614

## (undated) DEVICE — NDL SPINAL 22GA 3.5" QUINCKE 405181

## (undated) DEVICE — PREP CHLORAPREP 26ML TINTED ORANGE  260815

## (undated) DEVICE — SU MONOCRYL 4-0 PS-2 18" UND Y496G

## (undated) DEVICE — SUCTION CANISTER BEMIS HI FLOW 006772-901

## (undated) DEVICE — DRAPE BREAST/CHEST 29420

## (undated) RX ORDER — FENTANYL CITRATE 50 UG/ML
INJECTION, SOLUTION INTRAMUSCULAR; INTRAVENOUS
Status: DISPENSED
Start: 2019-05-28

## (undated) RX ORDER — DEXAMETHASONE SODIUM PHOSPHATE 4 MG/ML
INJECTION, SOLUTION INTRA-ARTICULAR; INTRALESIONAL; INTRAMUSCULAR; INTRAVENOUS; SOFT TISSUE
Status: DISPENSED
Start: 2019-12-31

## (undated) RX ORDER — PROPOFOL 10 MG/ML
INJECTION, EMULSION INTRAVENOUS
Status: DISPENSED
Start: 2019-12-31

## (undated) RX ORDER — ONDANSETRON 2 MG/ML
INJECTION INTRAMUSCULAR; INTRAVENOUS
Status: DISPENSED
Start: 2019-12-31

## (undated) RX ORDER — FENTANYL CITRATE 50 UG/ML
INJECTION, SOLUTION INTRAMUSCULAR; INTRAVENOUS
Status: DISPENSED
Start: 2019-12-31

## (undated) RX ORDER — LIDOCAINE HYDROCHLORIDE 10 MG/ML
INJECTION, SOLUTION EPIDURAL; INFILTRATION; INTRACAUDAL; PERINEURAL
Status: DISPENSED
Start: 2019-12-31

## (undated) RX ORDER — HYDROMORPHONE HYDROCHLORIDE 1 MG/ML
INJECTION, SOLUTION INTRAMUSCULAR; INTRAVENOUS; SUBCUTANEOUS
Status: DISPENSED
Start: 2019-05-28

## (undated) RX ORDER — KETOROLAC TROMETHAMINE 30 MG/ML
INJECTION, SOLUTION INTRAMUSCULAR; INTRAVENOUS
Status: DISPENSED
Start: 2019-12-31

## (undated) RX ORDER — CEFAZOLIN SODIUM 2 G/100ML
INJECTION, SOLUTION INTRAVENOUS
Status: DISPENSED
Start: 2019-05-28

## (undated) RX ORDER — ONDANSETRON 2 MG/ML
INJECTION INTRAMUSCULAR; INTRAVENOUS
Status: DISPENSED
Start: 2019-05-28

## (undated) RX ORDER — BUPIVACAINE HYDROCHLORIDE 2.5 MG/ML
INJECTION, SOLUTION EPIDURAL; INFILTRATION; INTRACAUDAL
Status: DISPENSED
Start: 2019-05-28

## (undated) RX ORDER — DEXAMETHASONE SODIUM PHOSPHATE 4 MG/ML
INJECTION, SOLUTION INTRA-ARTICULAR; INTRALESIONAL; INTRAMUSCULAR; INTRAVENOUS; SOFT TISSUE
Status: DISPENSED
Start: 2019-05-28

## (undated) RX ORDER — LIDOCAINE HYDROCHLORIDE AND EPINEPHRINE 10; 10 MG/ML; UG/ML
INJECTION, SOLUTION INFILTRATION; PERINEURAL
Status: DISPENSED
Start: 2019-12-31

## (undated) RX ORDER — LIDOCAINE HYDROCHLORIDE 10 MG/ML
INJECTION, SOLUTION EPIDURAL; INFILTRATION; INTRACAUDAL; PERINEURAL
Status: DISPENSED
Start: 2019-05-28

## (undated) RX ORDER — PROPOFOL 10 MG/ML
INJECTION, EMULSION INTRAVENOUS
Status: DISPENSED
Start: 2019-05-28

## (undated) RX ORDER — LIDOCAINE HYDROCHLORIDE 20 MG/ML
INJECTION, SOLUTION EPIDURAL; INFILTRATION; INTRACAUDAL; PERINEURAL
Status: DISPENSED
Start: 2019-05-28

## (undated) RX ORDER — SCOLOPAMINE TRANSDERMAL SYSTEM 1 MG/1
PATCH, EXTENDED RELEASE TRANSDERMAL
Status: DISPENSED
Start: 2019-05-28